# Patient Record
Sex: FEMALE | Race: WHITE | NOT HISPANIC OR LATINO | Employment: UNEMPLOYED | URBAN - METROPOLITAN AREA
[De-identification: names, ages, dates, MRNs, and addresses within clinical notes are randomized per-mention and may not be internally consistent; named-entity substitution may affect disease eponyms.]

---

## 2017-03-07 ENCOUNTER — HOSPITAL ENCOUNTER (EMERGENCY)
Facility: HOSPITAL | Age: 47
Discharge: HOME | End: 2017-03-07
Attending: EMERGENCY MEDICINE
Payer: MEDICAID

## 2017-03-07 VITALS
TEMPERATURE: 99 F | HEIGHT: 59 IN | SYSTOLIC BLOOD PRESSURE: 118 MMHG | OXYGEN SATURATION: 97 % | BODY MASS INDEX: 20.16 KG/M2 | DIASTOLIC BLOOD PRESSURE: 67 MMHG | WEIGHT: 100 LBS | RESPIRATION RATE: 16 BRPM | HEART RATE: 57 BPM

## 2017-03-07 DIAGNOSIS — R42 DIZZINESS: Primary | ICD-10-CM

## 2017-03-07 LAB
ANION GAP SERPL CALC-SCNC: 9 MMOL/L
BASOPHILS # BLD AUTO: 0.04 K/UL
BASOPHILS NFR BLD: 0.5 %
BUN SERPL-MCNC: 16 MG/DL
CALCIUM SERPL-MCNC: 9.9 MG/DL
CHLORIDE SERPL-SCNC: 105 MMOL/L
CO2 SERPL-SCNC: 26 MMOL/L
CREAT SERPL-MCNC: 0.7 MG/DL
DIFFERENTIAL METHOD: ABNORMAL
EOSINOPHIL # BLD AUTO: 0.1 K/UL
EOSINOPHIL NFR BLD: 1.3 %
ERYTHROCYTE [DISTWIDTH] IN BLOOD BY AUTOMATED COUNT: 13.1 %
EST. GFR  (AFRICAN AMERICAN): >60 ML/MIN/1.73 M^2
EST. GFR  (NON AFRICAN AMERICAN): >60 ML/MIN/1.73 M^2
GLUCOSE SERPL-MCNC: 98 MG/DL
HCT VFR BLD AUTO: 42.1 %
HGB BLD-MCNC: 14.6 G/DL
LYMPHOCYTES # BLD AUTO: 3.9 K/UL
LYMPHOCYTES NFR BLD: 44.9 %
MCH RBC QN AUTO: 30.5 PG
MCHC RBC AUTO-ENTMCNC: 34.7 %
MCV RBC AUTO: 88 FL
MONOCYTES # BLD AUTO: 0.7 K/UL
MONOCYTES NFR BLD: 8 %
NEUTROPHILS # BLD AUTO: 3.9 K/UL
NEUTROPHILS NFR BLD: 45.3 %
PLATELET # BLD AUTO: 262 K/UL
PMV BLD AUTO: 8.7 FL
POTASSIUM SERPL-SCNC: 4.2 MMOL/L
RBC # BLD AUTO: 4.79 M/UL
SODIUM SERPL-SCNC: 140 MMOL/L
WBC # BLD AUTO: 8.65 K/UL

## 2017-03-07 PROCEDURE — 63600175 PHARM REV CODE 636 W HCPCS: Performed by: EMERGENCY MEDICINE

## 2017-03-07 PROCEDURE — 85025 COMPLETE CBC W/AUTO DIFF WBC: CPT

## 2017-03-07 PROCEDURE — 25000003 PHARM REV CODE 250: Performed by: EMERGENCY MEDICINE

## 2017-03-07 PROCEDURE — 96374 THER/PROPH/DIAG INJ IV PUSH: CPT

## 2017-03-07 PROCEDURE — 99283 EMERGENCY DEPT VISIT LOW MDM: CPT | Mod: 25

## 2017-03-07 PROCEDURE — 96361 HYDRATE IV INFUSION ADD-ON: CPT

## 2017-03-07 PROCEDURE — 80048 BASIC METABOLIC PNL TOTAL CA: CPT

## 2017-03-07 RX ORDER — ONDANSETRON 2 MG/ML
4 INJECTION INTRAMUSCULAR; INTRAVENOUS
Status: COMPLETED | OUTPATIENT
Start: 2017-03-07 | End: 2017-03-07

## 2017-03-07 RX ORDER — MECLIZINE HCL 12.5 MG 12.5 MG/1
25 TABLET ORAL 3 TIMES DAILY PRN
Qty: 20 TABLET | Refills: 0 | Status: SHIPPED | OUTPATIENT
Start: 2017-03-07 | End: 2017-03-10

## 2017-03-07 RX ORDER — SODIUM CHLORIDE 9 MG/ML
1000 INJECTION, SOLUTION INTRAVENOUS ONCE
Status: COMPLETED | OUTPATIENT
Start: 2017-03-07 | End: 2017-03-07

## 2017-03-07 RX ORDER — MECLIZINE HYDROCHLORIDE 25 MG/1
50 TABLET ORAL
Status: COMPLETED | OUTPATIENT
Start: 2017-03-07 | End: 2017-03-07

## 2017-03-07 RX ADMIN — ONDANSETRON 4 MG: 2 INJECTION INTRAMUSCULAR; INTRAVENOUS at 02:03

## 2017-03-07 RX ADMIN — SODIUM CHLORIDE 1000 ML: 0.9 INJECTION, SOLUTION INTRAVENOUS at 02:03

## 2017-03-07 RX ADMIN — MECLIZINE 50 MG: 25 TABLET ORAL at 02:03

## 2017-03-07 NOTE — ED PROVIDER NOTES
"SCRIBE #1 NOTE: I, Apolinar Addison, am scribing for, and in the presence of, Lázaro López MD. I have scribed the entire note.      History      Chief Complaint   Patient presents with    Dizziness     Patient c/o dizziness, body aches, fatigue and SOB       Review of patient's allergies indicates:   Allergen Reactions    Asa [aspirin]     Keflex [cephalexin]      dizziness    Sulfa (sulfonamide antibiotics) Rash        HPI   HPI    3/7/2017, 1:46 PM   History obtained from the patient      History of Present Illness: Ami Massey is a 46 y.o. female patient who presents to the Emergency Department for dizziness which onset gradually today. Symptoms are constant and moderate in severity. Sx are exacerbated by nothing and relieved by nothing. Associated sxs include fatigue and generalized weakness. Pt states this dizziness is chronic due to the fluid in her ears as told via PCP and also states the dizziness she is experiencing today is no different than the dizziness she had in the past. Pt states she had a f/u appointment with her PCP to repeat blood work from one month ago but states "she was too busy to report to the ED." Pt is ambulatory into the ED without difficult. Patient denies any fever, N/V/D, chills, lightheadedness, HA, weakness/numbness, visual disturbance, gait problem and all other sxs at this time. No further complaints or concerns at this time.     Arrival mode: Personal vehicle    PCP: POLINA Dodson       Past Medical History:  Past Medical History:   Diagnosis Date    Anxiety     Hyperlipidemia        Past Surgical History:  Past Surgical History:   Procedure Laterality Date     SECTION      HYSTERECTOMY      has partial cervix and ovaries left         Family History:  history reviewed. Not pertinent.    Social History:  Social History     Social History Main Topics    Smoking status: Current Every Day Smoker     Packs/day: 0.50     Types: Cigarettes    Smokeless " tobacco: Unknown    Alcohol use No    Drug use: No    Sexual activity: Unknown       ROS   Review of Systems   Constitutional: Positive for fatigue. Negative for chills and fever.        (+)generalized weaknesss   HENT: Negative for congestion and sore throat.    Respiratory: Negative for chest tightness and shortness of breath.    Cardiovascular: Negative for chest pain.   Gastrointestinal: Negative for abdominal pain, nausea and vomiting.   Musculoskeletal: Negative for back pain and neck pain.   Skin: Negative for rash.   Neurological: Positive for dizziness. Negative for numbness and headaches.   Psychiatric/Behavioral: Negative for agitation and confusion.   All other systems reviewed and are negative.      Physical Exam    Initial Vitals   BP Pulse Resp Temp SpO2   03/07/17 1311 03/07/17 1311 03/07/17 1311 03/07/17 1311 03/07/17 1311   101/68 90 16 98.5 °F (36.9 °C) 97 %      Physical Exam  Nursing Notes and Vital Signs Reviewed.  Constitutional: Patient is in no apparent distress. Awake and alert. Well-developed and well-nourished.  Head: Atraumatic. Normocephalic.  Eyes: PERRL. EOM intact. Conjunctivae are not pale. No scleral icterus.  ENT: Mucous membranes are moist. Oropharynx is clear and symmetric.    Neck: Supple. Full ROM. No lymphadenopathy.  Cardiovascular: Regular rate. Regular rhythm. No murmurs, rubs, or gallops. Distal pulses are 2+ and symmetric.  Pulmonary/Chest: No respiratory distress. Clear to auscultation bilaterally. No wheezing, rales, or rhonchi.  Abdominal: Soft and non-distended.  There is no tenderness.  No rebound, guarding, or rigidity. Good bowel sounds.  Musculoskeletal: Moves all extremities. No obvious deformities. No edema. No calf tenderness.  Skin: Warm and dry.  Neurological: Patient is alert and oriented to person, place and time. Pupils ERRL and EOM normal. Cranial nerves II-XII are intact. Strength is full bilaterally; it is equal and 5/5 in bilateral upper and lower  "extremities. Light touch sense is intact. Speech is clear and normal. No dysmetria on finger to nose. Normal gait. No acute focal neurological deficits noted.  Psychiatric: Normal affect. Good eye contact. Appropriate in content.    ED Course    Procedures  ED Vital Signs:  Vitals:    03/07/17 1311 03/07/17 1640   BP: 101/68 118/67   Pulse: 90 (!) 57   Resp: 16 16   Temp: 98.5 °F (36.9 °C)    TempSrc: Oral    SpO2: 97% 97%   Weight: 45.4 kg (100 lb)    Height: 4' 11" (1.499 m)        Abnormal Lab Results:  Labs Reviewed   CBC W/ AUTO DIFFERENTIAL - Abnormal; Notable for the following:        Result Value    MPV 8.7 (*)     All other components within normal limits   BASIC METABOLIC PANEL        All Lab Results:  Results for orders placed or performed during the hospital encounter of 03/07/17   CBC auto differential   Result Value Ref Range    WBC 8.65 3.90 - 12.70 K/uL    RBC 4.79 4.00 - 5.40 M/uL    Hemoglobin 14.6 12.0 - 16.0 g/dL    Hematocrit 42.1 37.0 - 48.5 %    MCV 88 82 - 98 fL    MCH 30.5 27.0 - 31.0 pg    MCHC 34.7 32.0 - 36.0 %    RDW 13.1 11.5 - 14.5 %    Platelets 262 150 - 350 K/uL    MPV 8.7 (L) 9.2 - 12.9 fL    Gran # 3.9 1.8 - 7.7 K/uL    Lymph # 3.9 1.0 - 4.8 K/uL    Mono # 0.7 0.3 - 1.0 K/uL    Eos # 0.1 0.0 - 0.5 K/uL    Baso # 0.04 0.00 - 0.20 K/uL    Gran% 45.3 38.0 - 73.0 %    Lymph% 44.9 18.0 - 48.0 %    Mono% 8.0 4.0 - 15.0 %    Eosinophil% 1.3 0.0 - 8.0 %    Basophil% 0.5 0.0 - 1.9 %    Differential Method Automated    Basic metabolic panel   Result Value Ref Range    Sodium 140 136 - 145 mmol/L    Potassium 4.2 3.5 - 5.1 mmol/L    Chloride 105 95 - 110 mmol/L    CO2 26 23 - 29 mmol/L    Glucose 98 70 - 110 mg/dL    BUN, Bld 16 6 - 20 mg/dL    Creatinine 0.7 0.5 - 1.4 mg/dL    Calcium 9.9 8.7 - 10.5 mg/dL    Anion Gap 9 8 - 16 mmol/L    eGFR if African American >60 >60 mL/min/1.73 m^2    eGFR if non African American >60 >60 mL/min/1.73 m^2                The Emergency Provider reviewed the " vital signs and test results, which are outlined above.    ED Discussion     2:37 PM: Reassessed pt at this time.  Pt states her condition has improved at this time. Pt is in NAD. Pt is awake, alert, and oriented. Discussed with pt all pertinent ED information and results. Discussed pt dx and plan of tx. Gave pt all f/u and return to the ED instructions. All questions and concerns were addressed at this time. Pt expresses understanding of information and instructions, and is comfortable with plan to discharge. Pt is stable for discharge.    I discussed with patient and/or family/caretaker that evaluation in the ED does not suggest any emergent or life threatening medical conditions requiring immediate intervention beyond what was provided in the ED, and I believe patient is safe for discharge.  Regardless, an unremarkable evaluation in the ED does not preclude the development or presence of a serious of life threatening condition. As such, patient was instructed to return immediately for any worsening or change in current symptoms.      ED Medication(s):  Medications   0.9%  NaCl infusion (0 mLs Intravenous Stopped 3/7/17 6141)   ondansetron injection 4 mg (4 mg Intravenous Given 3/7/17 1416)   meclizine tablet 50 mg (50 mg Oral Given 3/7/17 1414)       Discharge Medication List as of 3/7/2017  2:47 PM      START taking these medications    Details   meclizine (ANTIVERT) 12.5 mg tablet Take 2 tablets (25 mg total) by mouth 3 (three) times daily as needed for Dizziness., Starting 3/7/2017, Until Discontinued, Print             Follow-up Information     Follow up with POLINA Dodson In 1 day.    Specialty:  Family Medicine    Why:  Patient should return to the ED for any concerns or worsening of condition.    Contact information:    [address] [facility]  [place] [address] [phone]          Follow up with Melchor Baltazar MD In 1 week.    Specialty:  Otolaryngology    Contact  information:    [address]  [place] [address]  [phone]              Medical Decision Making    Medical Decision Making:   Clinical Tests:   Lab Tests: Ordered and Reviewed           Scribe Attestation:   Scribe #1: I performed the above scribed service and the documentation accurately describes the services I performed. I attest to the accuracy of the note.    Attending:   Physician Attestation Statement for Scribe #1: I, Lázaro López MD, personally performed the services described in this documentation, as scribed by Apolinar Jaime, in my presence, and it is both accurate and complete.          Clinical Impression       ICD-10-CM ICD-9-CM   1. Dizziness R42 780.4       Disposition:   Disposition: Discharged  Condition: Stable         Lázaro López MD  03/08/17 9910

## 2017-03-07 NOTE — ED AVS SNAPSHOT
[facility]  [address]  [place] [address]               Ami Massey   3/7/2017  1:40 PM   ED    Description:  Female : 1970   Department:  [facility]           Your Care was Coordinated By:     Provider Role From To    Lázaro López MD Attending Provider 17 1340 --      Reason for Visit     Dizziness           Diagnoses this Visit        Comments    Dizziness    -  Primary       ED Disposition     None           To Do List           Follow-up Information     Follow up with POLINA Dodson In 1 day.    Specialty:  Family Medicine    Why:  Patient should return to the ED for any concerns or worsening of condition.    Contact information:    [address] [facility]  [place] [address] [phone]          Follow up with Melchor Baltazar MD In 1 week.    Specialty:  Otolaryngology    Contact information:    [address]  [place] [address]  [phone]         These Medications        Disp Refills Start End    meclizine (ANTIVERT) 12.5 mg tablet 20 tablet 0 3/7/2017     Take 2 tablets (25 mg total) by mouth 3 (three) times daily as needed for Dizziness. - Oral      [facility]     [facility] Care [facility] -  Assistance  Registered nurses in the [facility] provide clinical advisement, health education, appointment booking, and other advisory services.  Call for this free service at [phone].             Medications           Message regarding Medications     Verify the changes and/or additions to your medication regime listed below are the same as discussed with your clinician today.  If any of these changes or additions are incorrect, please notify your healthcare provider.        START taking these NEW medications        Refills    meclizine (ANTIVERT) 12.5 mg tablet 0    Sig: Take 2 tablets (25 mg total) by mouth 3 (three) times daily as needed for  "Dizziness.    Class: Print    Route: Oral      These medications were administered today        Dose Freq    0.9%  NaCl infusion 1,000 mL Once    Sig: Inject 1,000 mLs into the vein once.    Class: Normal    Route: Intravenous    ondansetron injection 4 mg 4 mg ED 1 Time    Sig: Inject 4 mg into the vein ED 1 Time.    Class: Normal    Route: Intravenous    meclizine tablet 50 mg 50 mg ED 1 Time    Sig: Take 2 tablets (50 mg total) by mouth ED 1 Time.    Class: Normal    Route: Oral           Verify that the below list of medications is an accurate representation of the medications you are currently taking.  If none reported, the list may be blank. If incorrect, please contact your healthcare provider. Carry this list with you in case of emergency.           Current Medications     ibuprofen (ADVIL,MOTRIN) 600 MG tablet Take 1 tablet (600 mg total) by mouth every 6 (six) hours as needed for Pain (take with food).    meclizine (ANTIVERT) 12.5 mg tablet Take 2 tablets (25 mg total) by mouth 3 (three) times daily as needed for Dizziness.    meclizine tablet 50 mg Take 2 tablets (50 mg total) by mouth ED 1 Time.    naproxen (NAPROSYN) 375 MG tablet Take 1 tablet (375 mg total) by mouth 2 (two) times daily with meals.           Clinical Reference Information           Your Vitals Were     BP Pulse Temp Resp Height Weight    101/68 (BP Location: Right arm, Patient Position: Sitting) 90 98.5 °F (36.9 °C) (Oral) 16 4' 11" (1.499 m) 45.4 kg (100 lb)    SpO2 BMI             97% 20.2 kg/m2         Allergies as of 3/7/2017        Reactions    Asa [Aspirin]     Keflex [Cephalexin]     dizziness    Sulfa (Sulfonamide Antibiotics) Rash      Immunizations Administered on Date of Encounter - 3/7/2017     None      ED Micro, Lab, POCT     Start Ordered       Status Ordering Provider    03/07/17 1348 03/07/17 1347  CBC auto differential  STAT      Final result     03/07/17 1348 03/07/17 1347  Basic metabolic panel  STAT      Final " result       ED Imaging Orders     None      [facility] Sign-Up     Activating your [facility] account is as easy as 1-2-3!     1) Visit [email or web address], select Sign Up Now, enter this activation code and your date of birth, then select Next.  [email or web address]  Expires: 4/21/2017  2:47 PM      2) Create a username and password to use when you visit [facility] in the future and select a security question in case you lose your password and select Next.    3) Enter your e-mail address and click Sign Up!    Additional Information  If you have questions, please e-mail [email or web address] or call [phone] to talk to our [facility] staff. Remember, [facility] is NOT to be used for urgent needs. For medical emergencies, dial 911.         Smoking Cessation     If you would like to quit smoking:   You may be eligible for free services if you are a [place] resident and started smoking cigarettes before September 1, 1988.  Call the Smoking Cessation Trust (SCT) toll free at (346) 175-0182 or (665) 688-4678.   Call 9-544-QUIT-NOW if you do not meet the above criteria.             [facility] complies with applicable Federal civil rights laws and does not discriminate on the basis of race, color, national origin, age, disability, or sex.        Language Assistance Services     ATTENTION: Language assistance services are available, free of charge. Please call [phone].      ATENCIÓN: Si habla español, tiene a ellison disposición servicios gratuitos de asistencia lingüística. Llame al [phone].     CHÚ Ý: N?u b?n nói Ti?ng Vi?t, có các d?ch v? h? tr? ngôn ng? mi?n phí dành cho b?n. G?i s? [phone].

## 2017-03-10 ENCOUNTER — CLINICAL SUPPORT (OUTPATIENT)
Dept: AUDIOLOGY | Facility: CLINIC | Age: 47
End: 2017-03-10
Payer: MEDICAID

## 2017-03-10 ENCOUNTER — TELEPHONE (OUTPATIENT)
Dept: OTOLARYNGOLOGY | Facility: CLINIC | Age: 47
End: 2017-03-10

## 2017-03-10 ENCOUNTER — OFFICE VISIT (OUTPATIENT)
Dept: OTOLARYNGOLOGY | Facility: CLINIC | Age: 47
End: 2017-03-10
Payer: MEDICAID

## 2017-03-10 VITALS
HEART RATE: 68 BPM | DIASTOLIC BLOOD PRESSURE: 78 MMHG | BODY MASS INDEX: 20.48 KG/M2 | SYSTOLIC BLOOD PRESSURE: 113 MMHG | WEIGHT: 101.44 LBS

## 2017-03-10 DIAGNOSIS — H93.13 TINNITUS, BILATERAL: ICD-10-CM

## 2017-03-10 DIAGNOSIS — H93.8X3 FULLNESS IN EAR, BILATERAL: ICD-10-CM

## 2017-03-10 DIAGNOSIS — R42 DIZZINESS: ICD-10-CM

## 2017-03-10 DIAGNOSIS — F17.200 SMOKER: ICD-10-CM

## 2017-03-10 DIAGNOSIS — J31.0 RHINITIS, UNSPECIFIED TYPE: Primary | ICD-10-CM

## 2017-03-10 DIAGNOSIS — H93.8X3 FULLNESS IN EAR, BILATERAL: Primary | ICD-10-CM

## 2017-03-10 PROCEDURE — 99204 OFFICE O/P NEW MOD 45 MIN: CPT | Mod: S$PBB,,, | Performed by: PHYSICIAN ASSISTANT

## 2017-03-10 PROCEDURE — 99999 PR PBB SHADOW E&M-EST. PATIENT-LVL III: CPT | Mod: PBBFAC,,, | Performed by: PHYSICIAN ASSISTANT

## 2017-03-10 PROCEDURE — 92567 TYMPANOMETRY: CPT | Mod: PBBFAC | Performed by: AUDIOLOGIST-HEARING AID FITTER

## 2017-03-10 RX ORDER — FLUTICASONE PROPIONATE 50 MCG
2 SPRAY, SUSPENSION (ML) NASAL DAILY
Qty: 1 BOTTLE | Refills: 12 | Status: SHIPPED | OUTPATIENT
Start: 2017-03-10 | End: 2018-03-30 | Stop reason: SDUPTHER

## 2017-03-10 NOTE — MR AVS SNAPSHOT
[facility]  [address] [facility] [address]  Phone: [phone]  Fax: [phone]                  Ami MUSA Callier   3/10/2017 1:40 PM   Office Visit    Description:  Female : 1970   Provider:  Magali Lazar PA-C   Department:  [facility]           Reason for Visit     Sinus Problem                To Do List           Future Appointments        Provider Department Dept Phone    3/10/2017 1:40 PM Magali Lazar PA-C [facility] [phone]    3/14/2017 4:00 PM [facility] Storm, [facility] [phone]      Goals (5 Years of Data)     None       These Medications        Disp Refills Start End    fluticasone (FLONASE) 50 mcg/actuation nasal spray 1 Bottle 12 3/10/2017     2 sprays by Each Nare route once daily. - Each Nare    Pharmacy: [facility] [address] [place] [facility] [place] [place] -  [facility] #: [phone]         [facility]     [facility] On Call Nurse Care Line -  Assistance  Registered nurses in the [facility] provide clinical advisement, health education, appointment booking, and other advisory services.  Call for this free service at [phone].             Medications           Message regarding Medications     Verify the changes and/or additions to your medication regime listed below are the same as discussed with your clinician today.  If any of these changes or additions are incorrect, please notify your healthcare provider.        START taking these NEW medications        Refills    fluticasone (FLONASE) 50 mcg/actuation nasal spray 12    Si sprays by Each Nare route once daily.    Class: Normal    Route: Each Nare      STOP taking these medications     ibuprofen (ADVIL,MOTRIN) 600 MG tablet Take 1 tablet (600 mg total) by mouth every 6 (six) hours as needed for Pain (take with food).    naproxen (NAPROSYN) 375 MG tablet Take 1 tablet (375 mg total) by  mouth 2 (two) times daily with meals.    meclizine (ANTIVERT) 12.5 mg tablet Take 2 tablets (25 mg total) by mouth 3 (three) times daily as needed for Dizziness.           Verify that the below list of medications is an accurate representation of the medications you are currently taking.  If none reported, the list may be blank. If incorrect, please contact your healthcare provider. Carry this list with you in case of emergency.           Current Medications     fluticasone (FLONASE) 50 mcg/actuation nasal spray 2 sprays by Each Nare route once daily.           Clinical Reference Information           Your Vitals Were     BP Pulse Weight BMI       113/78 68 46 kg (101 lb 6.6 oz) 20.48 kg/m2       Blood Pressure          Most Recent Value    BP  113/78      Allergies as of 3/10/2017     Asa [Aspirin]    Keflex [Cephalexin]    Sulfa (Sulfonamide Antibiotics)      Immunizations Administered on Date of Encounter - 3/10/2017     None      [facility] Sign-Up     Activating your [facility] account is as easy as 1-2-3!     1) Visit [email or web address], select Sign Up Now, enter this activation code and your date of birth, then select Next.  [email or web address]  Expires: 4/21/2017  2:47 PM      2) Create a username and password to use when you visit [facility] in the future and select a security question in case you lose your password and select Next.    3) Enter your e-mail address and click Sign Up!    Additional Information  If you have questions, please e-mail [email or web address] or call [phone] to talk to our [facility] staff. Remember, [facility] is NOT to be used for urgent needs. For medical emergencies, dial 911.         Smoking Cessation     If you would like to quit smoking:   You may be eligible for free services if you are a [place] resident and started smoking cigarettes before September 1, 1988.  Call the Smoking Cessation Trust (SCT) toll free at (441) 845-6370 or (856) 698-6432.   Call 4-606-QUIT-NOW if you  do not meet the above criteria.            Language Assistance Services     ATTENTION: Language assistance services are available, free of charge. Please call [phone].      ATENCIÓN: Si habla phil, tiene a ellison disposición servicios gratuitos de asistencia lingüística. Llame al [phone].     CHÚ Ý: N?u b?n nói Ti?ng Vi?t, có các d?ch v? h? tr? ngôn ng? mi?n phí dành cho b?n. G?i s? [phone].         [facility] complies with applicable Federal civil rights laws and does not discriminate on the basis of race, color, national origin, age, disability, or sex.

## 2017-03-10 NOTE — PROGRESS NOTES
"  Subjective:   Patient: Ami Massey [id number], :1970   Visit date:3/10/2017 11:48 AM    Chief Complaint:  Sinus Problem    HPI:  Ami is a 46 y.o. female who is here to see me today for sinus problems for few weeks.  She reports nasal congestion and clear runny nose for 2-3 weeks.  She has PCP a few times and was told she had fluid in her ears.  No antibiotics have been given.  She was started on Zyrtec but she stopped it because she noticed blood on tissues while blowing her nose.  She denies facial pain or pressure; denies thick nasal drainage or postnasal drainage.   Occasional sneezing; denies cough or sore throat; denies dysphagia. Denies fever.  She reports her ears feel full and her hearing seems diminished over past few weeks.  Does not notice one ear being the better hearing ear.  She also has tinnitus in both ears for few weeks.  Denies history of otologic surgery; denies history of loud noise exposure.  She tried Mucinex yesterday with no relief.  She's also having dizziness.  Describes it as "floating" all day, constant x 2 weeks; sometimes gets spinning sensation.  Denies aggravating or alleviating factors.  She is under a lot of stress right now with recent death of a friend.  Says when she cries, her nasal congestion and rhinorrhea are worse.  She does smoke.      Review of Systems:  Negative unless checked off.  Gen:  []fever   []fatigue  HENT:  []nosebleeds  []dental problem   Eyes:  []photophobia  []visual disturbance  Resp:  []chest tightness []wheezing  Card:  []chest pain  []leg swelling; Has Right bundle branch block  GI:  []abdominal pain []blood in stool  :  []dysuria  []hematuria  Musc:  []joint swelling  []gait problem  Skin:  []color change  []pallor  Neuro:  []seizures  []numbness  Hem:  []bruise/bleed easily  Psych:  []hallucinations  []behavioral problems  Allergy/Imm: is allergic to asa [aspirin]; keflex [cephalexin]; and sulfa (sulfonamide antibiotics).    Her meds, " allergies, medical, surgical, social & family histories were reviewed & updated:  -     She has a current medication list which includes the following prescription(s): fluticasone.  -     She  has a past medical history of Anxiety and Hyperlipidemia.   -     She  does not have a problem list on file.   -     She  has a past surgical history that includes Hysterectomy and  section.  -     She  reports that she has been smoking Cigarettes.  She has been smoking about 0.50 packs per day. She does not have any smokeless tobacco history on file. She reports that she does not drink alcohol or use illicit drugs.  -     Her family history is not on file.  -     She is allergic to asa [aspirin]; keflex [cephalexin]; and sulfa (sulfonamide antibiotics).    Objective:     Physical Exam:  Vitals:  /78  Pulse 68  Wt 46 kg (101 lb 6.6 oz)  BMI 20.48 kg/m2  General appearance:  Well developed, well nourished    Eyes:  Extraocular motions intact, PERRL    Communication:  no hoarseness, no dysphonia    Ears:  Otoscopy of external auditory canals and tympanic membranes was normal, clinical speech reception thresholds grossly intact, no mass/lesion of auricle.  No middle ear effusions.  Nose:  No masses/lesions of external nose, edematous nasal mucosa, septum, and turbinates were within normal limits.  Mouth:  Missing two right lower molars.  No mass/lesion of lips, gums, hard/soft palate, tongue, tonsils, or oropharynx.  Tender with palpation of right TMJ to angle of mandible.    Cardiovascular:  No pedal edema; Radial Pulses +2     Neck & Lymphatics:  No cervical lymphadenopathy, no neck mass/crepitus/ asymmetry, trachea is midline, no thyroid enlargement/tenderness/mass.    Psych: Oriented x3,  Alert with normal mood and affect.     Respiration/Chest:  Symmetric expansion during respiration, normal respiratory effort.    Skin:  Warm and intact. No ulcerations of face, scalp, neck.    Assessment & Plan:   Ami was  seen today for sinus problem.    Diagnoses and all orders for this visit:    Rhinitis, unspecified type    Dizziness    Tinnitus, bilateral    Fullness in ear, bilateral    Smoker    Other orders  -     fluticasone (FLONASE) 50 mcg/actuation nasal spray; 2 sprays by Each Nare route once daily.      1.  Rhinitis:  She does not meet criteria today for acute sinusitis.  No antibiotics necessary.  Would recommend Flonase daily.   The patient was given a prescription for a steroid nasal spray, and we discussed in detail the proper mechanism of use directing the spray away from the nasal septum.  In addition, we also discussed that it will take two to three weeks of daily use to achieve maximal effectiveness.  The patient will please call in 2-3 weeks with their progress.  Reassured her that increased nasal congestion and rhinorrhea while crying is normal.  2.  Dizziness:  I had a long discussion with the patient regarding their symptoms.  Dizziness, vertigo and disequilibrium are common symptoms reported by adults during visits to their doctors. They are all symptoms that can result from a peripheral vestibular disorder (a dysfunction of the balance organs of the inner ear) or central vestibular disorder (a dysfunction of one or more parts of the central nervous system that help process balance and spatial information).  There are also non-vestibular causes of dizziness, and dizziness can be linked to a wide array of problems such as blood-flow irregularities from cardiovascular problems and blood pressure fluctuations.  I would recommend a vestibular screen with audiogram for further diagnostic testing, and will contact the patient with further recommendations when that is complete. May need Ecochg.   3.  Tinnitus:  Recommend scheduling audiogram at her convenience.  We also discussed that tinnitus is most often caused by a hearing loss, and that as the hair cells are damaged, either genetic or as a result of loud noise  exposure, they then cause tinnitus.  Some patients find that restricting the salt or caffeine in their diet helps, and there is also an OTC supplement, lipflavinoids, that some people find to be effective though their benefit is not fully proven.  Tinnitus tends to be louder in times of stress and fatigue, and may decrease with time.  Sound machines may also be an effective masking technique if needed at night.  4.  Fullness in her ears:  Tympanograms today were both Type A, normal.  Discussed with patient that this confirms that she does not have fluid in her middle ear.  Discussed that Flonase may help with this as it treats ETD.  She also has tenderness over her right TMJ; she thinks her face is swollen on that side today but I don't appreciate any facial swelling or eryhtema or facial asymmetry or cervical or submandibular lymphadenopathy today.  She says she needs a bridge for her right lower teeth but hasn't gotten one yet.  We discussed TMJ arthralgia can cause ear pain.  It is important to avoid chewing gum or eating hard foods. Most cases of TMJ are temporary (usually <2 weeks); thus, treatment is usually conservative.    5.  Smoker:  Discussed with the patient the importance of stopping smoking, and that if they choose to continue to smoke it will make treating their nasal drainage more difficult.  Smoking irritates the lining of the nose, increasing nasal secretions and swelling. The nose becomes less able to cleanse itself and more susceptible to allergens. Smoking and secondhand smoke is associated with significant nasal and sinus disease and symptoms.  I would recommend irrigation with hypertonic saline solution and daily use of a nasal steroid spray.  Unfortunately, sometimes permanent damage has been done to the nasal mucosa (similar to lung damage) and nasal drainage may not improve even after stopping smoking.  I would not recommend pursuing any sort of allergy testing or treatment while the  patient is smoking, as it is unlikely to have significant benefit.        We discussed her medical conditions, treatments and plan.  Ami should return to clinic if any issues arise (symptoms worsen or persist), otherwise we will see her back in the clinic only as needed.    Thank you for allowing me to participate in the care of Ami.

## 2017-03-10 NOTE — TELEPHONE ENCOUNTER
----- Message from Kvng Lazar sent at 3/10/2017  8:23 AM CST -----  Contact: Pt   New medicaid pt requesting an hospital follow up for a sinus infection./ Pt is requesting to be seen today./ Pt can be reached at [phone] (nxqx)

## 2017-03-10 NOTE — PROGRESS NOTES
Tympanograms today (03/10/2017) per Magali Lazar PA-C.  Right ear: Type A.  .29 ml @ -16 daPa.  ECV: 1.1 ml  Left ear: Type A.  .57 ml @ -3 daPa.  ECV: 1.1 ml

## 2017-03-10 NOTE — TELEPHONE ENCOUNTER
Pt scheduled today to see Magali ORTA at 11:20am.  Pt verbalized understanding of date/time/location.

## 2017-03-14 ENCOUNTER — CLINICAL SUPPORT (OUTPATIENT)
Dept: AUDIOLOGY | Facility: CLINIC | Age: 47
End: 2017-03-14
Payer: MEDICAID

## 2017-03-14 DIAGNOSIS — R42 DIZZINESS AND GIDDINESS: Primary | ICD-10-CM

## 2017-03-14 PROCEDURE — 92567 TYMPANOMETRY: CPT | Mod: PBBFAC | Performed by: AUDIOLOGIST-HEARING AID FITTER

## 2017-03-14 PROCEDURE — 92540 BASIC VESTIBULAR EVALUATION: CPT | Mod: 26,S$PBB,, | Performed by: AUDIOLOGIST-HEARING AID FITTER

## 2017-03-14 PROCEDURE — 92557 COMPREHENSIVE HEARING TEST: CPT | Mod: PBBFAC | Performed by: AUDIOLOGIST-HEARING AID FITTER

## 2017-03-14 PROCEDURE — 92540 BASIC VESTIBULAR EVALUATION: CPT | Mod: PBBFAC | Performed by: AUDIOLOGIST-HEARING AID FITTER

## 2017-03-14 NOTE — PROGRESS NOTES
Referring provider: ROSALBA Dover Kayy Boogie was seen 03/14/2017 for an audiological evaluation and vestibular screen.  Patient complains of bilateral aural fullness.  She is using Flonase.  She denies hearing loss or tinnitus.  She is also having dizziness, described as floating all day.  This began about 2 weeks ago.  She sometimes gets spinning sensation. No aggravating or alleviating factors. She is under a lot of stress right now with recent death of a friend    Audiogram  Results reveal normal hearing 250-6000 Hz gently sloping to a mild indeterminate hearing loss at 8000 Hz for the right ear, and normal hearing 250-8000 Hz bilaterally.  Speech Reception Thresholds were  10 dBHL for the right ear and 15 dBHL for the left ear.   Word recognition scores were excellent for the right ear and excellent for the left ear.   Tympanograms were Type A for the right ear and Type A for the left ear.    Vestibular screen:  There was no gaze, spontaneous or positional nystagmus.  Head-shake test was normal - negative for nystagmus.  Kimberly-Hallpike was negative for BPPV to the right or to to the left.  Oculomotor function tests (sinusoidal tracking, saccade, OPK) were normal and symmetric.  Note: Patient did not like having having the video goggles with the lens closed, nervous in the dark and would remove goggles as soon as possible between subtests.     Summary: Normal VNG screen; no evidence of BPPV.    Rec:  ENT review  Continue with Flonase    Patient was counseled on the above findings.

## 2017-03-15 ENCOUNTER — TELEPHONE (OUTPATIENT)
Dept: OTOLARYNGOLOGY | Facility: CLINIC | Age: 47
End: 2017-03-15

## 2017-03-15 NOTE — TELEPHONE ENCOUNTER
----- Message from Magali Lazar PA-C sent at 3/15/2017  7:32 AM CDT -----  Please let Ms. Boogie know that I have reviewed her audiogram and inner ear testing and it shows that the balance function of the inner ear is normal.  No further dizziness workup from an ENT standpoint.  She should continue with Flonase everyday to help with ear fullness; no middle ear fluid on tympanograms.    ----- Message -----     From: John Paul Storm, CCC-A     Sent: 3/14/2017   4:46 PM       To: Magali Lazar PA-C    VNG review

## 2017-03-23 ENCOUNTER — HOSPITAL ENCOUNTER (EMERGENCY)
Facility: HOSPITAL | Age: 47
Discharge: HOME | End: 2017-03-23
Attending: EMERGENCY MEDICINE
Payer: MEDICAID

## 2017-03-23 ENCOUNTER — TELEPHONE (OUTPATIENT)
Dept: NEUROLOGY | Facility: CLINIC | Age: 47
End: 2017-03-23

## 2017-03-23 VITALS
SYSTOLIC BLOOD PRESSURE: 134 MMHG | OXYGEN SATURATION: 97 % | BODY MASS INDEX: 20.16 KG/M2 | TEMPERATURE: 98 F | RESPIRATION RATE: 18 BRPM | WEIGHT: 100 LBS | HEIGHT: 59 IN | HEART RATE: 72 BPM | DIASTOLIC BLOOD PRESSURE: 90 MMHG

## 2017-03-23 DIAGNOSIS — G47.9 DIFFICULTY SLEEPING: ICD-10-CM

## 2017-03-23 DIAGNOSIS — R42 DIZZINESS: Primary | ICD-10-CM

## 2017-03-23 LAB — POCT GLUCOSE: 80 MG/DL (ref 70–110)

## 2017-03-23 PROCEDURE — 25000003 PHARM REV CODE 250: Performed by: NURSE PRACTITIONER

## 2017-03-23 PROCEDURE — 99284 EMERGENCY DEPT VISIT MOD MDM: CPT

## 2017-03-23 PROCEDURE — 82962 GLUCOSE BLOOD TEST: CPT

## 2017-03-23 RX ORDER — CLONAZEPAM 1 MG/1
1 TABLET ORAL NIGHTLY
Qty: 15 TABLET | Refills: 0 | Status: SHIPPED | OUTPATIENT
Start: 2017-03-23 | End: 2018-07-29

## 2017-03-23 RX ORDER — MECLIZINE HYDROCHLORIDE 25 MG/1
25 TABLET ORAL
Status: COMPLETED | OUTPATIENT
Start: 2017-03-23 | End: 2017-03-23

## 2017-03-23 RX ORDER — LORAZEPAM 1 MG/1
1 TABLET ORAL
Status: COMPLETED | OUTPATIENT
Start: 2017-03-23 | End: 2017-03-23

## 2017-03-23 RX ADMIN — MECLIZINE 25 MG: 25 TABLET ORAL at 12:03

## 2017-03-23 RX ADMIN — LORAZEPAM 1 MG: 1 TABLET ORAL at 12:03

## 2017-03-23 NOTE — DISCHARGE INSTRUCTIONS

## 2017-03-23 NOTE — ED PROVIDER NOTES
"SCRIBE #1 NOTE: I, Cameron Demarco, am scribing for, and in the presence of, Sam Nieves NP. I have scribed the entire note.      History      Chief Complaint   Patient presents with    Dizziness     Patient c/o dizziness, and fatigue that started this morning        Review of patient's allergies indicates:   Allergen Reactions    Asa [aspirin]     Keflex [cephalexin]      dizziness    Sulfa (sulfonamide antibiotics) Rash        HPI   HPI    3/23/2017, 12:27 PM   History obtained from the patient      History of Present Illness: Ami Boogie is a 46 y.o. female patient who presents to the Emergency Department for dizziness which onset gradually 2-3 months ago. Sx are constant and moderate in severity. Pt states she has been seen multiple times with same complaint with no relief.  Pt reports difficulty sleeping with hot flashes since her hysterectomy 15. Pt states hot flashes occur during day but not as severe compared to night. She states she was given Effexor to try once which helped her sleep but pt did not like the tx as she "felt drugged" the next day. There are no mitigating or exacerbating factors noted. Pt denies any fever, chills, N/V/D, CP, SOB, focal weakness or numbness, HA, and all other sx at this time. Pt states she supposed to f/u with psychiatrist for her sleep problems. No further complaints or concerns at this time.        Arrival mode: Personal vehicle     PCP: POLINA Dodson       Past Medical History:  Past Medical History:   Diagnosis Date    Anxiety     Hyperlipidemia        Past Surgical History:  Past Surgical History:   Procedure Laterality Date     SECTION      HYSTERECTOMY      has partial cervix and ovaries left         Family History:  History reviewed. No pertinent family history.    Social History:  Social History     Social History Main Topics    Smoking status: Current Every Day Smoker     Packs/day: 0.50     Types: Cigarettes    Smokeless tobacco: " Not on file    Alcohol use No    Drug use: No    Sexual activity: Not on file       ROS   Review of Systems   Constitutional: Negative for chills and fever.        + hot flashes   HENT: Negative for sore throat and trouble swallowing.    Respiratory: Negative for cough and shortness of breath.    Cardiovascular: Negative for chest pain.   Gastrointestinal: Negative for abdominal pain, diarrhea, nausea and vomiting.   Genitourinary: Negative for dysuria.   Musculoskeletal: Negative for back pain.   Skin: Negative for rash and wound.   Neurological: Positive for dizziness. Negative for weakness, numbness and headaches.   Hematological: Does not bruise/bleed easily.   Psychiatric/Behavioral: Positive for sleep disturbance.   All other systems reviewed and are negative.      Physical Exam    Initial Vitals   BP Pulse Resp Temp SpO2   03/23/17 1130 03/23/17 1130 03/23/17 1130 03/23/17 1130 03/23/17 1130   134/90 72 18 98 °F (36.7 °C) 97 %      Physical Exam  Nursing Notes and Vital Signs Reviewed.  Constitutional: Patient is in no acute distress. Awake and alert. Well-developed and well-nourished.  Head: Atraumatic. Normocephalic.  Eyes: PERRL. EOM intact. Conjunctivae are not pale. No scleral icterus.  ENT: Mucous membranes are moist. Oropharynx is clear and symmetric.    Neck: Supple. Full ROM. No lymphadenopathy.  Cardiovascular: Regular rate. Regular rhythm. No murmurs, rubs, or gallops. Distal pulses are 2+ and symmetric.  Pulmonary/Chest: No respiratory distress. Clear to auscultation bilaterally. No wheezing, rales, or rhonchi.  Abdominal: Soft and non-distended.    Musculoskeletal: Moves all extremities. No obvious deformities. No edema.  Skin: Warm and dry.  Neurological: Patient is alert and oriented to person, place and time. Pupils ERRL and EOM normal. Cranial nerves II-XII are intact. Finger-to-nose is normal. Strength is full bilaterally; it is equal and 5/5 in bilateral upper and lower extremities.  "Light touch sense is intact. Speech is clear and normal. No acute focal neurological deficits noted.  Psychiatric: Normal affect. Good eye contact. Appropriate in content.    ED Course    Procedures  ED Vital Signs:  Vitals:    03/23/17 1130   BP: (!) 134/90   Pulse: 72   Resp: 18   Temp: 98 °F (36.7 °C)   TempSrc: Oral   SpO2: 97%   Weight: 45.4 kg (100 lb)   Height: 4' 11" (1.499 m)       Abnormal Lab Results:  Labs Reviewed   POCT GLUCOSE        All Lab Results:  Results for orders placed or performed during the hospital encounter of 03/23/17   POCT glucose   Result Value Ref Range    POCT Glucose 80 70 - 110 mg/dL         Imaging Results:  Imaging Results         CT Head Without Contrast (Final result) Result time:  03/23/17 13:18:28    Final result by JOSEFINA Mancia Sr., MD (03/23/17 13:18:28)    Impression:         Normal study.      All CT scans at this facility use dose modulation, iterative reconstruction, and/or weight base dosing when appropriate to reduce radiation dose when appropriate to reduce radiation dose to as low as reasonably achievable.      Electronically signed by: JOSEFINA MANCIA MD  Date:     03/23/17  Time:    13:18     Narrative:    CT of Head without IV contrast    History: Dizziness    Technique: Standard brain CT protocol without IV contrast was performed.     Finding: The ventricles have a normal size, position, and appearance. There is no abnormal intracranial mass or intracranial hemorrhage. There is no skull fracture. The paranasal sinuses are normal in appearance.                 The Emergency Provider reviewed the vital signs and test results, which are outlined above.    ED Discussion     1:24 PM: Reassessed pt at this time. Awake and alert. NAD. Discussed with pt all pertinent ED information and results. Discussed pt dx and plan of tx. Gave pt all f/u and return to the ED instructions. All questions and concerns were addressed at this time. Pt expresses understanding of " information and instructions, and is comfortable with plan to discharge. Pt is stable for discharge.    I discussed with patient and/or family/caretaker that evaluation in the ED does not suggest any emergent or life threatening medical conditions requiring immediate intervention beyond what was provided in the ED, and I believe patient is safe for discharge.  Regardless, an unremarkable evaluation in the ED does not preclude the development or presence of a serious of life threatening condition. As such, patient was instructed to return immediately for any worsening or change in current symptoms.      Pre-hypertension/Hypertension: The pt has been informed that they may have pre-hypertension or hypertension based on a blood pressure reading in the ED. I recommend that the pt call the PCP listed on their discharge instructions or a physician of their choice this week to arrange f/u for further evaluation of possible pre-hypertension or hypertension.     ED Medication(s):  Medications   lorazepam tablet 1 mg (1 mg Oral Given 3/23/17 1245)   meclizine tablet 25 mg (25 mg Oral Given 3/23/17 1245)       New Prescriptions    CLONAZEPAM (KLONOPIN) 1 MG TABLET    Take 1 tablet (1 mg total) by mouth every evening.       Follow-up Information     Follow up with POLINA Dodson. Schedule an appointment as soon as possible for a visit in 2 days.    Specialty:  Family Medicine    Contact information:    [address] [facility] [place] [address] [phone]               Medical Decision Making    Medical Decision Making:   History:   Old Medical Records: I decided to obtain old medical records.           Scribe Attestation:   Scribe #1: I performed the above scribed service and the documentation accurately describes the services I performed. I attest to the accuracy of the note.    APC:   APC Attestation Statement for Scribe #1: I, Sam Nieves NP, personally performed the services described in  this documentation, as scribed by Cameron Demarco in my presence, and it is both accurate and complete.          Clinical Impression       ICD-10-CM ICD-9-CM   1. Dizziness R42 780.4   2. Difficulty sleeping G47.9 780.50       Disposition:   Disposition: Discharged  Condition: Stable           Sam Nieves NP  03/23/17 [id number]

## 2017-03-23 NOTE — ED AVS SNAPSHOT
[facility]  [address]  [place] [address]               Ami Arnoldudreaux   3/23/2017 12:24 PM   ED    Description:  Female : 1970   Department:  [facility]           Your Care was Coordinated By:     Provider Role From To    Sam Nieves NP Nurse Practitioner 17 1224 --      Reason for Visit     Dizziness           Diagnoses this Visit        Comments    Dizziness    -  Primary     Difficulty sleeping           ED Disposition     ED Disposition Condition Comment    Discharge             To Do List           Follow-up Information     Follow up with POLINA Dodson. Schedule an appointment as soon as possible for a visit in 2 days.    Specialty:  Family Medicine    Contact information:    [address] [facility] [id number] [phone]          Follow up with [facility]. Schedule an appointment as soon as possible for a visit in 2 days.    Contact information:    [phone]       These Medications        Disp Refills Start End    clonazePAM (KLONOPIN) 1 MG tablet 15 tablet 0 3/23/2017 2017    Take 1 tablet (1 mg total) by mouth every evening. - Oral    Pharmacy: [facility] [id number] [facility] - [place], [place] -  [facility] #: [phone]         [facility]     [facility] Nurse Care [facility] -  Assistance  Registered nurses in the [facility] provide clinical advisement, health education, appointment booking, and other advisory services.  Call for this free service at [phone].             Medications           Message regarding Medications     Verify the changes and/or additions to your medication regime listed below are the same as discussed with your clinician today.  If any of these changes or additions are incorrect, please notify your healthcare provider.        START taking these NEW medications        Refills    clonazePAM (KLONOPIN)  "1 MG tablet 0    Sig: Take 1 tablet (1 mg total) by mouth every evening.    Class: Print    Route: Oral      These medications were administered today        Dose Freq    lorazepam tablet 1 mg 1 mg ED 1 Time    Sig: Take 1 tablet (1 mg total) by mouth ED 1 Time.    Class: Normal    Route: Oral    meclizine tablet 25 mg 25 mg ED 1 Time    Sig: Take 1 tablet (25 mg total) by mouth ED 1 Time.    Class: Normal    Route: Oral           Verify that the below list of medications is an accurate representation of the medications you are currently taking.  If none reported, the list may be blank. If incorrect, please contact your healthcare provider. Carry this list with you in case of emergency.           Current Medications     clonazePAM (KLONOPIN) 1 MG tablet Take 1 tablet (1 mg total) by mouth every evening.    fluticasone (FLONASE) 50 mcg/actuation nasal spray 2 sprays by Each Nare route once daily.           Clinical Reference Information           Your Vitals Were     BP Pulse Temp Resp Height Weight    134/90 (BP Location: Right arm, Patient Position: Sitting) 72 98 °F (36.7 °C) (Oral) 18 4' 11" (1.499 m) 45.4 kg (100 lb)    SpO2 BMI             97% 20.2 kg/m2         Allergies as of 3/23/2017        Reactions    Asa [Aspirin]     Keflex [Cephalexin]     dizziness    Sulfa (Sulfonamide Antibiotics) Rash      Immunizations Administered on Date of Encounter - 3/23/2017     None      ED Micro, Lab, POCT     Start Ordered       Status Ordering Provider    03/23/17 1127 03/23/17 1127  POCT glucose  Once      Final result       ED Imaging Orders     Start Ordered       Status Ordering Provider    03/23/17 1237 03/23/17 1237  CT Head Without Contrast  1 time imaging      Final result         Discharge Instructions         Dizziness (Uncertain Cause)  Dizziness is a common symptom. It may be described as lightheadedness, spinning, or feeling like you are going to faint. Dizziness can have many causes.  Be sure to tell the " healthcare provider about:  · All medicines you take, including prescription, over-the-counter, herbs, and supplements  · Any other symptoms you have  · Any health problems you are being treated for  · Anything that causes the dizziness to get worse or better  Today's exam did not show an exact cause for your dizziness. Other tests may be needed. Follow up with your healthcare provider.  Home care  · Dizziness that occurs with sudden standing may be a sign of mild dehydration. Drink extra fluids for the next few days.  · If you recently started a new medicine, stopped a medicine, or had the dose of a current medicine changed, talk with the prescribing healthcare provider. Your medicine plan may need adjustment.  · If dizziness lasts more than a few seconds, sit or lie down until it passes. This may help prevent injury in case you pass out.  · Do not drive or use power tools or dangerous equipment until you have had no dizziness for at least 48 hours.  Follow-up care  Follow up with your healthcare provider for further evaluation within the next 7 days or as advised.  When to seek medical advice  Call your healthcare provider for any of the following:  · Worsening of symptoms or new symptoms  · Passing out or seizure  · Repeated vomiting  · Headache  · Palpitations (the sense that your heart is fluttering or beating fast or hard)  · Shortness of breath  · Blood in vomit or stool (black or red color)  · Weakness of an arm or leg or one side of the face  · Vision or hearing changes  · Trouble walking or speaking  · Chest, arm, neck, back, or jaw pain  Date Last Reviewed: 8/23/2015  © 6990-8653 [employer]. [address] [place] [address], [place] [address]. All rights reserved. This information is not intended as a substitute for professional medical care. Always follow your healthcare professional's instructions.          [facility] Sign-Up     Activating your [facility] account is as easy as 1-2-3!     1) Visit  [email or web address], select Sign Up Now, enter this activation code and your date of birth, then select Next.  [email or web address]  Expires: 4/21/2017  3:47 PM      2) Create a username and password to use when you visit [facility] in the future and select a security question in case you lose your password and select Next.    3) Enter your e-mail address and click Sign Up!    Additional Information  If you have questions, please e-mail [email or web address] or call [phone] to talk to our [facility] staff. Remember, [facility] is NOT to be used for urgent needs. For medical emergencies, dial 911.          [facility] complies with applicable Federal civil rights laws and does not discriminate on the basis of race, color, national origin, age, disability, or sex.        Language Assistance Services     ATTENTION: Language assistance services are available, free of charge. Please call [phone].      ATENCIÓN: Si habla español, tiene a ellison disposición servicios gratuitos de asistencia lingüística. Llame al [phone].     CHÚ Ý: N?u b?n nói Ti?ng Vi?t, có các d?ch v? h? tr? ngôn ng? mi?n phí dành cho b?n. G?i s? [phone].

## 2017-03-23 NOTE — TELEPHONE ENCOUNTER
----- Message from Nelly Morin sent at 3/23/2017  1:57 PM CDT -----  Contact: Patient  Patient states the hospital told her to make an appointment with a neurologist in 3 days, patient is new and has medicaid. Please call her back at [phone] please a message with her son if she doesnt answer. Thank you

## 2017-10-23 ENCOUNTER — HOSPITAL ENCOUNTER (EMERGENCY)
Facility: HOSPITAL | Age: 47
Discharge: HOME | End: 2017-10-23
Payer: MEDICAID

## 2017-10-23 VITALS
RESPIRATION RATE: 20 BRPM | SYSTOLIC BLOOD PRESSURE: 135 MMHG | TEMPERATURE: 98 F | HEART RATE: 86 BPM | OXYGEN SATURATION: 100 % | DIASTOLIC BLOOD PRESSURE: 92 MMHG | WEIGHT: 96.44 LBS | BODY MASS INDEX: 19.44 KG/M2 | HEIGHT: 59 IN

## 2017-10-23 DIAGNOSIS — N30.90 CYSTITIS: Primary | ICD-10-CM

## 2017-10-23 DIAGNOSIS — L02.91 ABSCESS: ICD-10-CM

## 2017-10-23 LAB
BACTERIA #/AREA URNS HPF: ABNORMAL /HPF
BILIRUB UR QL STRIP: NEGATIVE
CLARITY UR: CLEAR
COLOR UR: YELLOW
GLUCOSE UR QL STRIP: NEGATIVE
HGB UR QL STRIP: NEGATIVE
KETONES UR QL STRIP: NEGATIVE
LEUKOCYTE ESTERASE UR QL STRIP: ABNORMAL
MICROSCOPIC COMMENT: ABNORMAL
NITRITE UR QL STRIP: NEGATIVE
PH UR STRIP: 6 [PH] (ref 5–8)
PROT UR QL STRIP: NEGATIVE
RBC #/AREA URNS HPF: 3 /HPF (ref 0–4)
SP GR UR STRIP: 1.01 (ref 1–1.03)
SQUAMOUS #/AREA URNS HPF: 10 /HPF
URN SPEC COLLECT METH UR: ABNORMAL
UROBILINOGEN UR STRIP-ACNC: NEGATIVE EU/DL
WBC #/AREA URNS HPF: 40 /HPF (ref 0–5)

## 2017-10-23 PROCEDURE — 81000 URINALYSIS NONAUTO W/SCOPE: CPT

## 2017-10-23 PROCEDURE — 87086 URINE CULTURE/COLONY COUNT: CPT

## 2017-10-23 PROCEDURE — 99283 EMERGENCY DEPT VISIT LOW MDM: CPT

## 2017-10-23 RX ORDER — AMOXICILLIN 500 MG/1
500 CAPSULE ORAL
COMMUNITY
End: 2017-10-25 | Stop reason: ALTCHOICE

## 2017-10-23 RX ORDER — MUPIROCIN 20 MG/G
OINTMENT TOPICAL 3 TIMES DAILY
Qty: 30 G | Refills: 0 | Status: SHIPPED | OUTPATIENT
Start: 2017-10-23 | End: 2017-11-02

## 2017-10-23 RX ORDER — ATORVASTATIN CALCIUM 20 MG/1
20 TABLET, FILM COATED ORAL DAILY
COMMUNITY

## 2017-10-23 RX ORDER — PHENAZOPYRIDINE HYDROCHLORIDE 200 MG/1
200 TABLET, FILM COATED ORAL 3 TIMES DAILY PRN
COMMUNITY
End: 2018-07-16

## 2017-10-23 RX ORDER — NITROFURANTOIN 25; 75 MG/1; MG/1
100 CAPSULE ORAL
COMMUNITY
End: 2017-10-25 | Stop reason: SINTOL

## 2017-10-24 NOTE — ED PROVIDER NOTES
SCRIBE #1 NOTE: I, Femi Luque, am scribing for, and in the presence of, YOU Oakley. I have scribed the entire note.      History      Chief Complaint   Patient presents with    Urinary Tract Infection     States she is being treated, but thinks she needs to be on different antibiotics.    Abscess     to right ankle; on abx.  See prior note.       Review of patient's allergies indicates:   Allergen Reactions    Asa [aspirin]     Keflex [cephalexin]      dizziness    Sulfa (sulfonamide antibiotics) Rash        HPI   HPI    10/23/2017, 9:21 PM   History obtained from the patient      History of Present Illness: Ami Boogie is a 47 y.o. female patient who presents to the Emergency Department for further evaluation of a possible UTI which onset gradually 1 week ago. Pt complains of increased urinary frequency. Symptoms are constant and moderate in severity. Pt is currently on Macrobid and Amoxicillin for tx of a UTI and abscess, prescribed by PCP. Pt denies having blood work/ UA done. No mitigating or exacerbating factors reported.  Patient denies any fever, chills, diaphoresis, dysuria, hematuria, abd pain, pelvic pain, and all other sxs at this time. No further complaints or concerns at this time.  Patient states that she is concerned she is not on correct antibiotic for UTI; she states that PCP did not obtain urine for a urinalysis or urine culture.  Patient is requesting urinalysis and urine culture.         Arrival mode: Personal vehicle    PCP: POLINA Dodson       Past Medical History:  Past Medical History:   Diagnosis Date    Anxiety     Hyperlipidemia        Past Surgical History:  Past Surgical History:   Procedure Laterality Date     SECTION      HYSTERECTOMY      has partial cervix and ovaries left         Family History:  History reviewed. No pertinent family history.    Social History:  Social History     Social History Main Topics    Smoking status: Current  Every Day Smoker     Packs/day: 0.50     Types: Cigarettes    Smokeless tobacco: Unknown    Alcohol use No    Drug use: No    Sexual activity: Unknown       ROS   Review of Systems   Constitutional: Negative for chills, diaphoresis and fever.   Respiratory: Negative for shortness of breath.    Cardiovascular: Negative for chest pain.   Gastrointestinal: Negative for abdominal pain, diarrhea, nausea and vomiting.   Genitourinary: Positive for frequency (increased). Negative for difficulty urinating, dysuria, hematuria, menstrual problem, pelvic pain and urgency.   Musculoskeletal: Negative for back pain.        + abscess to the right ankle   Skin: Negative for rash.   Neurological: Negative for dizziness, syncope, weakness, light-headedness, numbness and headaches.   All other systems reviewed and are negative.      Physical Exam      Initial Vitals [10/23/17 2117]   BP Pulse Resp Temp SpO2   (!) 135/92 86 20 98.3 °F (36.8 °C) 100 %      MAP       106.33          Physical Exam  Nursing Notes and Vital Signs Reviewed.  Constitutional: Patient is in no apparent distress. Well-developed and well-nourished.  Head: Atraumatic. Normocephalic.  Eyes: PERRL. EOM intact. Conjunctivae are not pale. No scleral icterus.  ENT: Mucous membranes are moist.   Neck: Supple. Full ROM. No lymphadenopathy.  Cardiovascular: Regular rate. Regular rhythm. No murmurs, rubs, or gallops. Distal pulses are 2+ and symmetric.  Pulmonary/Chest: No respiratory distress. Clear to auscultation bilaterally. No wheezing, rales, or rhonchi.  Abdominal: Soft and non-distended.  There is no tenderness.  No rebound, guarding, or rigidity. Good bowel sounds.  Musculoskeletal: Moves all extremities. No obvious deformities. No edema. No calf tenderness.  Skin: Warm and dry. 1.5 cm abscess to the right lower leg appreciated.   Neurological:  Alert, awake, and appropriate.  Normal speech.  No acute focal neurological deficits are appreciated.  Psychiatric:  "Normal affect. Good eye contact. Appropriate in content.    ED Course    Procedures  ED Vital Signs:  Vitals:    10/23/17 2117   BP: (!) 135/92   Pulse: 86   Resp: 20   Temp: 98.3 °F (36.8 °C)   TempSrc: Oral   SpO2: 100%   Weight: 43.8 kg (96 lb 7.2 oz)   Height: 4' 11" (1.499 m)          Results for orders placed or performed during the hospital encounter of 10/23/17   Urinalysis Clean Catch   Result Value Ref Range    Specimen UA Urine, Clean Catch     Color, UA Yellow Yellow, Straw, Ivy    Appearance, UA Clear Clear    pH, UA 6.0 5.0 - 8.0    Specific Gravity, UA 1.015 1.005 - 1.030    Protein, UA Negative Negative    Glucose, UA Negative Negative    Ketones, UA Negative Negative    Bilirubin (UA) Negative Negative    Occult Blood UA Negative Negative    Nitrite, UA Negative Negative    Urobilinogen, UA Negative <2.0 EU/dL    Leukocytes, UA 1+ (A) Negative   Urinalysis Microscopic   Result Value Ref Range    RBC, UA 3 0 - 4 /hpf    WBC, UA 40 (H) 0 - 5 /hpf    Bacteria, UA Occasional None-Occ /hpf    Squam Epithel, UA 10 /hpf    Microscopic Comment SEE COMMENT          The Emergency Provider reviewed the vital signs and test results, which are outlined above.    ED Discussion     9:46 PM:  Discussed with pt all pertinent ED information and results. Discussed pt dx and plan of tx. Recommend patient continue current medication regimen.  Depending on urine culture; patient may need different antibiotic.  Gave pt all f/u and return to the ED instructions. All questions and concerns were addressed at this time. Pt expresses understanding of information and instructions, and is comfortable with plan to discharge. Pt is stable for discharge.    I discussed with patient and/or family/caretaker that evaluation in the ED does not suggest any emergent or life threatening medical conditions requiring immediate intervention beyond what was provided in the ED, and I believe patient is safe for discharge.  Regardless, an " unremarkable evaluation in the ED does not preclude the development or presence of a serious of life threatening condition. As such, patient was instructed to return immediately for any worsening or change in current symptoms.      ED Medication(s):  Medications - No data to display    Discharge Medication List as of 10/23/2017  9:48 PM      START taking these medications    Details   mupirocin (BACTROBAN) 2 % ointment Apply topically 3 (three) times daily., Starting Mon 10/23/2017, Until Thu 11/2/2017, Print             Follow-up Information     POLINA Dodson In 3 days.    Specialty:  Family Medicine  Contact information:  [address] [facility] [address]  [phone]                     Medical Decision Making              Scribe Attestation:   Scribe #1: I performed the above scribed service and the documentation accurately describes the services I performed. I attest to the accuracy of the note.    Attending:   Physician Attestation Statement for Scribe #1: I, YOU Oakley, personally performed the services described in this documentation, as scribed by Femi Luque, in my presence, and it is both accurate and complete.         Attending Attestation:     Physician Attestation Statement for NP/PA:   I discussed this assessment and plan of this patient with the NP/PA, but I did not personally examine the patient. The face to face encounter was performed by the NP/PA.              Clinical Impression       ICD-10-CM ICD-9-CM   1. Cystitis N30.90 595.9   2. Abscess L02.91 682.9       Disposition:   Disposition: Discharged  Condition: Stable         Jessie Houston PA-C  10/24/17 0228       Jessie Houston PA-C  10/24/17 0233       Abram Sands MD  10/24/17 5750

## 2017-10-25 ENCOUNTER — HOSPITAL ENCOUNTER (EMERGENCY)
Facility: HOSPITAL | Age: 47
Discharge: HOME | End: 2017-10-25
Attending: EMERGENCY MEDICINE
Payer: MEDICAID

## 2017-10-25 VITALS
SYSTOLIC BLOOD PRESSURE: 116 MMHG | BODY MASS INDEX: 19.56 KG/M2 | RESPIRATION RATE: 17 BRPM | OXYGEN SATURATION: 98 % | HEIGHT: 59 IN | WEIGHT: 97 LBS | DIASTOLIC BLOOD PRESSURE: 79 MMHG | TEMPERATURE: 99 F | HEART RATE: 98 BPM

## 2017-10-25 DIAGNOSIS — L03.90 CELLULITIS: ICD-10-CM

## 2017-10-25 DIAGNOSIS — L03.115 CELLULITIS OF RIGHT ANKLE: Primary | ICD-10-CM

## 2017-10-25 DIAGNOSIS — F41.9 ANXIETY: ICD-10-CM

## 2017-10-25 DIAGNOSIS — T50.905A MEDICATION SIDE EFFECT, INITIAL ENCOUNTER: ICD-10-CM

## 2017-10-25 LAB
BACTERIA UR CULT: NO GROWTH
BILIRUB UR QL STRIP: NEGATIVE
CLARITY UR: CLEAR
COLOR UR: YELLOW
GLUCOSE UR QL STRIP: NEGATIVE
HGB UR QL STRIP: ABNORMAL
KETONES UR QL STRIP: NEGATIVE
LEUKOCYTE ESTERASE UR QL STRIP: NEGATIVE
NITRITE UR QL STRIP: NEGATIVE
PH UR STRIP: 6 [PH] (ref 5–8)
PROT UR QL STRIP: NEGATIVE
SP GR UR STRIP: 1.02 (ref 1–1.03)
URN SPEC COLLECT METH UR: ABNORMAL
UROBILINOGEN UR STRIP-ACNC: NEGATIVE EU/DL

## 2017-10-25 PROCEDURE — 81003 URINALYSIS AUTO W/O SCOPE: CPT

## 2017-10-25 PROCEDURE — 99284 EMERGENCY DEPT VISIT MOD MDM: CPT

## 2017-10-25 RX ORDER — CLINDAMYCIN HYDROCHLORIDE 150 MG/1
300 CAPSULE ORAL 4 TIMES DAILY
Qty: 56 CAPSULE | Refills: 0 | Status: SHIPPED | OUTPATIENT
Start: 2017-10-25 | End: 2017-10-26 | Stop reason: CLARIF

## 2017-10-25 NOTE — ED PROVIDER NOTES
SCRIBE #1 NOTE: I, Nathan Sheppard, am scribing for, and in the presence of, Molly Bennett MD. I have scribed the entire note.      History      Chief Complaint   Patient presents with    Medication Reaction     pt states she had a blister that got clawed by her dog, states medication has caused her to be druged to the core, glazed eyes. taking antibiotics.        Review of patient's allergies indicates:   Allergen Reactions    Asa [aspirin]     Ciprofloxacin     Keflex [cephalexin]      dizziness    Sulfa (sulfonamide antibiotics) Rash        HPI   HPI    10/25/2017, 1:45 PM   History obtained from the patient and son      History of Present Illness: Ami Boogie is a 47 y.o. female patient who presents to the Emergency Department for rash which onset gradually 2 days ago after being started on Amoxicillin. Sxs are constant and moderate in severity. Rash described as bumps and located to chest, trunk, and back. There are no mitigating or exacerbating factors noted. Pt has wound to right ankle from her dog scratching her with it's claws 2 days ago, currently on amoxicillin. Associated sxs include fatigue which pt believes is from her ABX.  Pt denies any fever, N/V/D, chills, calf pain, leg pain or swelling, numbness, LOC, ABD pain, trouble swallowing, and all other sxs at this time. Prior tx includes Amoxicillin 500 mg BID. Pt is also taking Macrobid for UTI. Pt was seen in ED previously for wound and started using Bactroban Rx today. No further complaints or concerns at this time.  Immunizations up to date.       Arrival mode: Personal vehicle        PCP: POLINA Dodson       Past Medical History:  Past Medical History:   Diagnosis Date    Anxiety     Hyperlipidemia        Past Surgical History:  Past Surgical History:   Procedure Laterality Date     SECTION      HYSTERECTOMY      has partial cervix and ovaries left         Family History:  unknown    Social  History:  Social History     Social History Main Topics    Smoking status: Current Every Day Smoker     Packs/day: 0.50     Types: Cigarettes    Smokeless tobacco: Never Used    Alcohol use No    Drug use: No    Sexual activity: unknown       ROS   Review of Systems   Constitutional: Positive for fatigue. Negative for fever.   HENT: Negative for sore throat.    Respiratory: Negative for shortness of breath.    Cardiovascular: Negative for chest pain.   Gastrointestinal: Negative for abdominal pain, constipation, nausea and vomiting.   Genitourinary: Negative for dysuria.   Musculoskeletal: Negative for back pain.   Skin: Positive for rash and wound (right ankle).   Neurological: Negative for dizziness, syncope, weakness, numbness and headaches.   Hematological: Does not bruise/bleed easily.     Physical Exam      Initial Vitals [10/25/17 1258]   BP Pulse Resp Temp SpO2   115/84 (!) 116 16 98.8 °F (37.1 °C) 98 %      MAP       94.33          Physical Exam  Nursing Notes and Vital Signs Reviewed.  Constitutional: Patient is in no acute distress. Well-developed and well-nourished.  Head: Atraumatic. Normocephalic.  Eyes: PERRL. EOM intact. Conjunctivae are not pale. No scleral icterus.  ENT: Mucous membranes are moist. Oropharynx is clear and symmetric.    Neck: Supple. Full ROM. No lymphadenopathy.  Cardiovascular: Regular rate. Regular rhythm. No murmurs, rubs, or gallops. Distal pulses are 2+ and symmetric.  Pulmonary/Chest: No respiratory distress. Clear to auscultation bilaterally. No wheezing, rales, or rhonchi.  Abdominal: Soft and non-distended.  There is no tenderness.  No rebound, guarding, or rigidity. Good bowel sounds.  Genitourinary: No CVA tenderness  Musculoskeletal: Moves all extremities. No obvious deformities. No edema. No calf tenderness.  Skin: Warm and dry. Small macules to trunk and back, no evidence of secondary infection. Small area of blanching erythema and mild warmth to right outer  "ankle consistent with soft tissue cellulitis, no abscess, no induration, no fluctuance. No calf pain or calf swelling, or leg swelling.   Neurological:  Alert, awake, and appropriate.  Normal speech.  No acute focal neurological deficits are appreciated.  Psychiatric: Normal affect. Good eye contact. Appropriate in content. Anxious.    ED Course    Procedures  ED Vital Signs:  Vitals:    10/25/17 1258 10/25/17 1456   BP: 115/84 116/79   Pulse: (!) 116 98   Resp: 16 17   Temp: 98.8 °F (37.1 °C)    TempSrc: Oral    SpO2: 98%    Weight: 44 kg (97 lb)    Height: 4' 11" (1.499 m)        Abnormal Lab Results:  Labs Reviewed   URINALYSIS - Abnormal; Notable for the following:        Result Value    Occult Blood UA Trace (*)     All other components within normal limits        All Lab Results:  Results for orders placed or performed during the hospital encounter of 10/25/17   Urinalysis Clean Catch   Result Value Ref Range    Specimen UA Urine, Clean Catch     Color, UA Yellow Yellow, Straw, Ivy    Appearance, UA Clear Clear    pH, UA 6.0 5.0 - 8.0    Specific Gravity, UA 1.020 1.005 - 1.030    Protein, UA Negative Negative    Glucose, UA Negative Negative    Ketones, UA Negative Negative    Bilirubin (UA) Negative Negative    Occult Blood UA Trace (A) Negative    Nitrite, UA Negative Negative    Urobilinogen, UA Negative <2.0 EU/dL    Leukocytes, UA Negative Negative         Imaging Results:  Imaging Results          X-Ray Ankle Complete Right (Final result)  Result time 10/25/17 14:13:51    Final result by Jermaine Rojas MD (10/25/17 14:13:51)                 Impression:      No acute fracture or dislocation.        Electronically signed by: JERMAINE ROJAS MD  Date:     10/25/17  Time:    14:13              Narrative:    History:  Saline this    Comparison:  None    Results:  Three views of the right foot were obtained.    No evidence of acute fracture or dislocation.  Bony mineralization is normal.  Mild soft tissue " swelling. No subcutaneous gas.                                      The Emergency Provider reviewed the vital signs and test results, which are outlined above.    ED Discussion     2:49 PM: Reassessed pt. Discussed with pt all pertinent ED information and results. Discussed plan of treatment with pt. Pt instructed to discontinue macrobid (likely cause of her drowsiness and rash)and amoxicillin. Will start on Clindamycin for cellulitis. Pt will be started on clindamycin and is instructed to continue using bactroban as Rx. Gave pt all f/u and return to the ED instructions. All questions and concerns were addressed at this time. Pt understands and agrees to plan as discussed. Pt is stable for discharge.       ED Medication(s):  Medications - No data to display    Discharge Medication List as of 10/25/2017  2:49 PM      START taking these medications    Details   clindamycin (CLEOCIN) 150 MG capsule Take 2 capsules (300 mg total) by mouth 4 (four) times daily., Starting Wed 10/25/2017, Until Wed 11/1/2017, Print             Follow-up Information     POLINA Dodson. Schedule an appointment as soon as possible for a visit in 2 days.    Specialty:  Family Medicine  Why:  Return to the Emergency room, If symptoms worsen  Contact information:  [address] [facility] [address]  [phone]                     Medical Decision Making    Medical Decision Making:   Clinical Tests:   Lab Tests: Reviewed and Ordered  Radiological Study: Reviewed and Ordered           Scribe Attestation:   Scribe #1: I performed the above scribed service and the documentation accurately describes the services I performed. I attest to the accuracy of the note.    Attending:   Physician Attestation Statement for Scribe #1: I, Molly Bennett MD, personally performed the services described in this documentation, as scribed by Nathan Sheppard, in my presence, and it is both accurate and complete.           Clinical Impression       ICD-10-CM ICD-9-CM   1. Cellulitis of right ankle L03.115 682.6   2. Cellulitis L03.90 682.9   3. Anxiety F41.9 300.00   4. Medication side effect, initial encounter T88.7XXA E947.9       Disposition:   Disposition: Discharged  Condition: Stable         Molly Bennett MD  10/25/17 7874

## 2017-10-26 ENCOUNTER — HOSPITAL ENCOUNTER (EMERGENCY)
Facility: HOSPITAL | Age: 47
Discharge: HOME | End: 2017-10-26
Attending: EMERGENCY MEDICINE
Payer: MEDICAID

## 2017-10-26 VITALS
HEIGHT: 59 IN | WEIGHT: 99 LBS | TEMPERATURE: 100 F | BODY MASS INDEX: 19.96 KG/M2 | OXYGEN SATURATION: 99 % | RESPIRATION RATE: 18 BRPM | DIASTOLIC BLOOD PRESSURE: 63 MMHG | SYSTOLIC BLOOD PRESSURE: 104 MMHG | HEART RATE: 80 BPM

## 2017-10-26 DIAGNOSIS — L03.115 CELLULITIS OF RIGHT LOWER EXTREMITY: Primary | ICD-10-CM

## 2017-10-26 LAB
ALBUMIN SERPL BCP-MCNC: 3.7 G/DL
ALP SERPL-CCNC: 188 U/L
ALT SERPL W/O P-5'-P-CCNC: 155 U/L
ANION GAP SERPL CALC-SCNC: 11 MMOL/L
AST SERPL-CCNC: 116 U/L
BASOPHILS # BLD AUTO: 0.03 K/UL
BASOPHILS NFR BLD: 0.5 %
BILIRUB SERPL-MCNC: 0.5 MG/DL
BILIRUB UR QL STRIP: NEGATIVE
BUN SERPL-MCNC: 13 MG/DL
CALCIUM SERPL-MCNC: 9.8 MG/DL
CHLORIDE SERPL-SCNC: 101 MMOL/L
CLARITY UR: CLEAR
CO2 SERPL-SCNC: 27 MMOL/L
COLOR UR: YELLOW
CREAT SERPL-MCNC: 0.8 MG/DL
DIFFERENTIAL METHOD: ABNORMAL
EOSINOPHIL # BLD AUTO: 0 K/UL
EOSINOPHIL NFR BLD: 0 %
ERYTHROCYTE [DISTWIDTH] IN BLOOD BY AUTOMATED COUNT: 13.3 %
EST. GFR  (AFRICAN AMERICAN): >60 ML/MIN/1.73 M^2
EST. GFR  (NON AFRICAN AMERICAN): >60 ML/MIN/1.73 M^2
GLUCOSE SERPL-MCNC: 98 MG/DL
GLUCOSE UR QL STRIP: NEGATIVE
HCT VFR BLD AUTO: 40.6 %
HGB BLD-MCNC: 13.8 G/DL
HGB UR QL STRIP: ABNORMAL
KETONES UR QL STRIP: NEGATIVE
LACTATE SERPL-SCNC: 1.7 MMOL/L
LEUKOCYTE ESTERASE UR QL STRIP: NEGATIVE
LYMPHOCYTES # BLD AUTO: 1.9 K/UL
LYMPHOCYTES NFR BLD: 30.5 %
MCH RBC QN AUTO: 30.1 PG
MCHC RBC AUTO-ENTMCNC: 34 G/DL
MCV RBC AUTO: 89 FL
MONOCYTES # BLD AUTO: 1 K/UL
MONOCYTES NFR BLD: 15.9 %
NEUTROPHILS # BLD AUTO: 3.2 K/UL
NEUTROPHILS NFR BLD: 53.1 %
NITRITE UR QL STRIP: NEGATIVE
PH UR STRIP: 6 [PH] (ref 5–8)
PLATELET # BLD AUTO: 190 K/UL
PMV BLD AUTO: 9.3 FL
POTASSIUM SERPL-SCNC: 3.9 MMOL/L
PROT SERPL-MCNC: 7.7 G/DL
PROT UR QL STRIP: NEGATIVE
RBC # BLD AUTO: 4.58 M/UL
SODIUM SERPL-SCNC: 139 MMOL/L
SP GR UR STRIP: <=1.005 (ref 1–1.03)
URN SPEC COLLECT METH UR: ABNORMAL
UROBILINOGEN UR STRIP-ACNC: NEGATIVE EU/DL
WBC # BLD AUTO: 6.1 K/UL

## 2017-10-26 PROCEDURE — 80053 COMPREHEN METABOLIC PANEL: CPT

## 2017-10-26 PROCEDURE — 83605 ASSAY OF LACTIC ACID: CPT

## 2017-10-26 PROCEDURE — 85025 COMPLETE CBC W/AUTO DIFF WBC: CPT

## 2017-10-26 PROCEDURE — 99283 EMERGENCY DEPT VISIT LOW MDM: CPT

## 2017-10-26 PROCEDURE — 81003 URINALYSIS AUTO W/O SCOPE: CPT

## 2017-10-26 PROCEDURE — 87040 BLOOD CULTURE FOR BACTERIA: CPT | Mod: 59

## 2017-10-26 RX ORDER — SULFAMETHOXAZOLE AND TRIMETHOPRIM 800; 160 MG/1; MG/1
2 TABLET ORAL 2 TIMES DAILY
Qty: 28 TABLET | Refills: 0 | Status: SHIPPED | OUTPATIENT
Start: 2017-10-26 | End: 2017-11-02

## 2017-10-26 RX ORDER — FLUCONAZOLE 150 MG/1
150 TABLET ORAL ONCE
Qty: 1 TABLET | Refills: 0 | Status: SHIPPED | OUTPATIENT
Start: 2017-10-26 | End: 2017-10-26

## 2017-10-26 NOTE — ED PROVIDER NOTES
"SCRIBE #1 NOTE: I, Lorenzo Sawant, am scribing for, and in the presence of, Jose Cason MD. I have scribed the entire note.      History      Chief Complaint   Patient presents with    Rash     pt c/o generalized rash, pt states she is having an "allergic reaction," pt states "they need to admit me, I need cultures to make sure this is not in my blood."       Review of patient's allergies indicates:   Allergen Reactions    Asa [aspirin]     Augmentin [amoxicillin-pot clavulanate] Other (See Comments)     Pt states she can take amoxicillin alone, but cannot tolerate Augmentin, it eroded her esophagus.    Ciprofloxacin     Keflex [cephalexin]      dizziness    Sulfa (sulfonamide antibiotics) Rash        HPI   HPI    10/26/2017, 3:18 PM   History obtained from the patient      History of Present Illness: Ami Boogie is a 47 y.o. female patient who presents to the Emergency Department for an evaluation of a rash which onset gradually x4 days ago. Pt was seen in this ED x2 since onset for an evaluation and has returned today for admission to have her blood cultured to rule out a blood infection. Pt is currently on Amoxicillin 500 mg BID, Macrobid for a UTI, and Bactroban for a wound she was evaluated for during her first visit to this ED. Pt describes her rash as bumps across her chest, trunk, and back. Symptoms are constant and moderate in severity. Exacerbated by nothing and relieved by nothing. Patient denies any fever, chills, difficulty swallowing, tongue swelling, voice change, SOB, and all other sxs at this time. No further complaints or concerns at this time.     Arrival mode: Personal vehicle    PCP: POLINA Dodson       Past Medical History:  Past Medical History:   Diagnosis Date    Anxiety     Hyperlipidemia        Past Surgical History:  Past Surgical History:   Procedure Laterality Date     SECTION      HYSTERECTOMY      has partial cervix and ovaries left     "     Family History:  Family history reviewed not relevant      Social History:  Social History     Social History Main Topics    Smoking status: Current Every Day Smoker     Packs/day: 0.50     Types: Cigarettes    Smokeless tobacco: Never Used    Alcohol use No    Drug use: No    Sexual activity: Unknown       ROS   Review of Systems   Constitutional: Negative for chills and fever.   HENT: Negative for congestion, sore throat, trouble swallowing and voice change.    Respiratory: Negative for cough and shortness of breath.    Cardiovascular: Negative for chest pain.   Gastrointestinal: Negative for abdominal pain, nausea and vomiting.   Genitourinary: Negative for dysuria and hematuria.   Musculoskeletal: Negative for back pain, neck pain and neck stiffness.   Skin: Positive for rash. Negative for pallor and wound.   Neurological: Negative for weakness, light-headedness and headaches.   Hematological: Does not bruise/bleed easily.     Physical Exam      Initial Vitals [10/26/17 1455]   BP Pulse Resp Temp SpO2   103/68 102 20 100.3 °F (37.9 °C) 98 %      MAP       79.67          Physical Exam  Nursing Notes and Vital Signs Reviewed.  Constitutional: Patient is in no acute distress. Well-developed and well-nourished.  Head: Atraumatic. Normocephalic.  Eyes: PERRL. EOM intact. Conjunctivae are not pale. No scleral icterus.  ENT: Mucous membranes are moist. Oropharynx is clear and symmetric.    Neck: Supple. Full ROM. No lymphadenopathy.  Cardiovascular: Regular rate. Regular rhythm.  Pulmonary/Chest: No respiratory distress.  Abdominal: Soft and non-distended.  There is no tenderness.  Musculoskeletal: Moves all extremities. No obvious deformities. 1 cm area of induration to RLE with no fluctuance or erythema.   Skin: Warm and dry.  Neurological:  Alert, awake, and appropriate.  Normal speech.  No acute focal neurological deficits are appreciated.  Psychiatric: Normal affect. Good eye contact. Appropriate in  "content.    ED Course    Procedures  ED Vital Signs:  Vitals:    10/26/17 1455 10/26/17 1655   BP: 103/68 104/63   Pulse: 102 80   Resp: 20 18   Temp: 100.3 °F (37.9 °C)    TempSrc: Oral    SpO2: 98% 99%   Weight: 44.9 kg (98 lb 15.8 oz)    Height: 4' 11" (1.499 m)        Abnormal Lab Results:  Labs Reviewed   CBC W/ AUTO DIFFERENTIAL - Abnormal; Notable for the following:        Result Value    Mono% 15.9 (*)     All other components within normal limits   COMPREHENSIVE METABOLIC PANEL - Abnormal; Notable for the following:     Alkaline Phosphatase 188 (*)      (*)      (*)     All other components within normal limits   URINALYSIS - Abnormal; Notable for the following:     Specific Gravity, UA <=1.005 (*)     Occult Blood UA Trace (*)     All other components within normal limits   CULTURE, BLOOD   CULTURE, BLOOD   LACTIC ACID, PLASMA        All Lab Results:  Results for orders placed or performed during the hospital encounter of 10/26/17   CBC auto differential   Result Value Ref Range    WBC 6.10 3.90 - 12.70 K/uL    RBC 4.58 4.00 - 5.40 M/uL    Hemoglobin 13.8 12.0 - 16.0 g/dL    Hematocrit 40.6 37.0 - 48.5 %    MCV 89 82 - 98 fL    MCH 30.1 27.0 - 31.0 pg    MCHC 34.0 32.0 - 36.0 g/dL    RDW 13.3 11.5 - 14.5 %    Platelets 190 150 - 350 K/uL    MPV 9.3 9.2 - 12.9 fL    Gran # 3.2 1.8 - 7.7 K/uL    Lymph # 1.9 1.0 - 4.8 K/uL    Mono # 1.0 0.3 - 1.0 K/uL    Eos # 0.0 0.0 - 0.5 K/uL    Baso # 0.03 0.00 - 0.20 K/uL    Gran% 53.1 38.0 - 73.0 %    Lymph% 30.5 18.0 - 48.0 %    Mono% 15.9 (H) 4.0 - 15.0 %    Eosinophil% 0.0 0.0 - 8.0 %    Basophil% 0.5 0.0 - 1.9 %    Differential Method Automated    Comprehensive metabolic panel   Result Value Ref Range    Sodium 139 136 - 145 mmol/L    Potassium 3.9 3.5 - 5.1 mmol/L    Chloride 101 95 - 110 mmol/L    CO2 27 23 - 29 mmol/L    Glucose 98 70 - 110 mg/dL    BUN, Bld 13 6 - 20 mg/dL    Creatinine 0.8 0.5 - 1.4 mg/dL    Calcium 9.8 8.7 - 10.5 mg/dL    Total " Protein 7.7 6.0 - 8.4 g/dL    Albumin 3.7 3.5 - 5.2 g/dL    Total Bilirubin 0.5 0.1 - 1.0 mg/dL    Alkaline Phosphatase 188 (H) 55 - 135 U/L     (H) 10 - 40 U/L     (H) 10 - 44 U/L    Anion Gap 11 8 - 16 mmol/L    eGFR if African American >60 >60 mL/min/1.73 m^2    eGFR if non African American >60 >60 mL/min/1.73 m^2   Urinalysis   Result Value Ref Range    Specimen UA Urine, Clean Catch     Color, UA Yellow Yellow, Straw, Ivy    Appearance, UA Clear Clear    pH, UA 6.0 5.0 - 8.0    Specific Gravity, UA <=1.005 (A) 1.005 - 1.030    Protein, UA Negative Negative    Glucose, UA Negative Negative    Ketones, UA Negative Negative    Bilirubin (UA) Negative Negative    Occult Blood UA Trace (A) Negative    Nitrite, UA Negative Negative    Urobilinogen, UA Negative <2.0 EU/dL    Leukocytes, UA Negative Negative   Lactic acid, plasma   Result Value Ref Range    Lactate (Lactic Acid) 1.7 0.5 - 2.2 mmol/L            The Emergency Provider reviewed the vital signs and test results, which are outlined above.    ED Discussion     5:23 PM: Reassessed pt at this time. Pt is awake, alert, and in NAD. Pt states her condition has improved at this time. Discussed with pt all pertinent ED information and results. Discussed pt dx of cellulitis of right lower extremity and plan of tx. Gave pt all f/u and return to the ED instructions. All questions and concerns were addressed at this time. Pt expresses understanding of information and instructions, and is comfortable with plan to discharge. Pt is stable for discharge.    I discussed with patient and/or family/caretaker that evaluation in the ED does not suggest any emergent or life threatening medical conditions requiring immediate intervention beyond what was provided in the ED, and I believe patient is safe for discharge.  Regardless, an unremarkable evaluation in the ED does not preclude the development or presence of a serious of life threatening condition. As such,  patient was instructed to return immediately for any worsening or change in current symptoms.      ED Medication(s):  Medications - No data to display    New Prescriptions    No medications on file             Medical Decision Making    Medical Decision Making:   Clinical Tests:   Lab Tests: Ordered and Reviewed           Scribe Attestation:   Scribe #1: I performed the above scribed service and the documentation accurately describes the services I performed. I attest to the accuracy of the note.    Attending:   Physician Attestation Statement for Scribe #1: I, Jose Cason MD, personally performed the services described in this documentation, as scribed by Lorenzo Sawant, in my presence, and it is both accurate and complete.          Clinical Impression       ICD-10-CM ICD-9-CM   1. Cellulitis of right lower extremity L03.115 682.6       Disposition:   Disposition: Discharged  Condition: Stable         Jose Cason MD  10/26/17 1744

## 2017-10-31 LAB
BACTERIA BLD CULT: NORMAL
BACTERIA BLD CULT: NORMAL

## 2018-04-02 RX ORDER — FLUTICASONE PROPIONATE 50 MCG
SPRAY, SUSPENSION (ML) NASAL
Qty: 1 BOTTLE | Refills: 12 | Status: SHIPPED | OUTPATIENT
Start: 2018-04-02 | End: 2019-05-01 | Stop reason: SDUPTHER

## 2018-07-16 ENCOUNTER — OFFICE VISIT (OUTPATIENT)
Dept: OTOLARYNGOLOGY | Facility: CLINIC | Age: 48
End: 2018-07-16
Payer: MEDICAID

## 2018-07-16 ENCOUNTER — CLINICAL SUPPORT (OUTPATIENT)
Dept: AUDIOLOGY | Facility: CLINIC | Age: 48
End: 2018-07-16
Payer: MEDICAID

## 2018-07-16 VITALS
TEMPERATURE: 98 F | HEART RATE: 84 BPM | DIASTOLIC BLOOD PRESSURE: 70 MMHG | SYSTOLIC BLOOD PRESSURE: 110 MMHG | BODY MASS INDEX: 20.66 KG/M2 | WEIGHT: 102.31 LBS

## 2018-07-16 DIAGNOSIS — H91.90 HEARING LOSS, UNSPECIFIED HEARING LOSS TYPE, UNSPECIFIED LATERALITY: Primary | ICD-10-CM

## 2018-07-16 DIAGNOSIS — H90.41 SENSORINEURAL HEARING LOSS (SNHL) OF RIGHT EAR WITH UNRESTRICTED HEARING OF LEFT EAR: Primary | ICD-10-CM

## 2018-07-16 PROCEDURE — 99212 OFFICE O/P EST SF 10 MIN: CPT | Mod: PBBFAC,PO,25 | Performed by: OTOLARYNGOLOGY

## 2018-07-16 PROCEDURE — 99214 OFFICE O/P EST MOD 30 MIN: CPT | Mod: S$PBB,,, | Performed by: OTOLARYNGOLOGY

## 2018-07-16 PROCEDURE — 92557 COMPREHENSIVE HEARING TEST: CPT | Mod: PBBFAC,PO | Performed by: AUDIOLOGIST-HEARING AID FITTER

## 2018-07-16 PROCEDURE — 99999 PR PBB SHADOW E&M-EST. PATIENT-LVL II: CPT | Mod: PBBFAC,,, | Performed by: OTOLARYNGOLOGY

## 2018-07-16 PROCEDURE — 92567 TYMPANOMETRY: CPT | Mod: PBBFAC,PO | Performed by: AUDIOLOGIST-HEARING AID FITTER

## 2018-07-16 NOTE — PROGRESS NOTES
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  Patient complains of decreased hearing in her right ear only over that last few months. She reports intermittent tinnitus bilaterally. She denies otalgia, vertigo, and aural fullness today.    Results reveal normal hearing 250-6000 Hz gently sloping to a mild indeterminate hearing loss at 8000 Hz for the right ear, and normal hearing 250-8000 Hz bilaterally.  Speech Reception Thresholds were  10 dBHL for the right ear and 10 dBHL for the left ear.   Word recognition scores were excellent for the right ear and excellent for the left ear.   Tympanograms were Type A for the right ear and Type A for the left ear.  Patient was counseled on the above findings.    Recommendations:  1. Annual Audiograms  2. Hearing protection in noise.

## 2018-07-16 NOTE — PROGRESS NOTES
Subjective:   Patient: Ami Boogie [id number], :1970   Visit date:2018 2:05 PM    Chief Complaint:  Other (not hearing well in the Right ear.)    HPI:  Ami is a 47 y.o. female who is here for evaluation of decreased hearing in her right ear x several months. She was last here in 2017, audiogram at that time was normal.   Hearing Loss:    She describes a right sided hearing loss starting about 1 month ago and has been unchanged.      The patient reports the following risk factors for hearing loss (Negative unless checked off):   [] Familial deafness   [] Ototoxic medication exposure  [] Acoustic trauma  []  Occupational exposure  [] Head injury or trauma  [] Otologic infection  [] History of meningitis  [] Ear surgery (other than pediatric tympanostomy tubes)  [] Metabolic disease      Severity: moderate  Quality: muffled  Modifying factors:  None  Associated symptoms include   [] Vertigo  [] Tinnitus  [] Otalgia  [] Drainage or pain   Previous treatments include none.          Review of Systems:  -     Allergic/Immunologic: is allergic to asa [aspirin]; augmentin [amoxicillin-pot clavulanate]; bactrim [sulfamethoxazole-trimethoprim]; ciprofloxacin; keflex [cephalexin]; and sulfa (sulfonamide antibiotics)..  -     Constitutional: Current temp: 97.9 °F (36.6 °C) (Tympanic)    Her meds, allergies, medical, surgical, social & family histories were reviewed & updated:  -     She has a current medication list which includes the following prescription(s): atorvastatin, clonazepam, and fluticasone.  -     She  has a past medical history of Anxiety and Hyperlipidemia.   -     She  does not have a problem list on file.   -     She  has a past surgical history that includes Hysterectomy and  section.  -     She  reports that she has been smoking Cigarettes.  She has been smoking about 0.50 packs per day. She has never used smokeless tobacco. She reports that she does not drink alcohol or use  drugs.  -     Her family history is not on file.  -     She is allergic to asa [aspirin]; augmentin [amoxicillin-pot clavulanate]; bactrim [sulfamethoxazole-trimethoprim]; ciprofloxacin; keflex [cephalexin]; and sulfa (sulfonamide antibiotics).    Objective:     Physical Exam:  Vitals:  /70   Pulse 84   Temp 97.9 °F (36.6 °C) (Tympanic)   Wt 46.4 kg (102 lb 4.7 oz)   BMI 20.66 kg/m²   Communication:  Able to communicate, no hoarseness.  Head & Face:  Normocephalic, atraumatic, no sinus tenderness.  Eyes:  Extraocular motions intact.  Ears:  Otoscopy of external auditory canals and tympanic membranes was normal, clinical speech reception thresholds grossly intact, no mass/lesion of auricle.  Nose:  No masses/lesions of external nose, nasal mucosa, septum, and turbinates were within normal limits.  Mouth:  No mass/lesion of lips, teeth, gums, hard/soft palate, tongue, tonsils, or oropharynx.  Neck & Lymphatics:  No cervical lymphadenopathy, no neck mass/crepitus/ asymmetry, trachea is midline, no thyroid enlargement/tenderness/mass.  Neuro/Psych: Alert with normal mood and affect.   Respiration/Chest:  Symmetric expansion during respiration, normal respiratory effort.  Skin:  Warm and intact.    [place] and [place]:  Midline,+AU    Assessment & Plan:   Ami was seen today for other.    Diagnoses and all orders for this visit:    Hearing loss, unspecified hearing loss type, unspecified laterality      -HEARING LOSS-  I spent a considerable amount of time educating the patient on hearing and hearing loss.  We discussed the basic characteristics of conductive hearing loss versus sensorineural hearing loss and the significant differences in treatment options between the two categories.      We discussed that in cases of conductive hearing loss, this suggests a mechanical disorder that sometimes can be improved with medications &/or surgery.  It may occur secondary to external ear pathology (atresia, otitis  externa, etc), tympanic membrane disorders (large perforations, immobility due to scarring or eustachian tube dysfunction), and middle ear disorders (effusions, ossicular disorders).    Sensorineural hearing loss is the expected hearing loss pattern with aging, but some disorders such as Meniere's may accelerate this process.  Additionally, amplification with hearing aids is generally the best option for hearing rehabilitation, except where the hearing loss is profound.  We discussed that this generally does not represent a dangerous condition, but in cases where there is a large discrepancy between the two ears in terms of nerve function, more investigation is often necessary due to the possiblity of vestibular schwannomas or meningiomas at the cerebellopontine angle.  The definitive test for this is an MRI with gadolinium.  However, these masses are usually very slow growing (1-2mm/year), so patients may elect to repeat an audiogram in about 6 months or obtain an ABR so long as they understand that this may result in a delay in diagnosis (although very unlikely that this would have a significant clinical impact on their outcome due to the slow growing nature of these masses) with the understanding that if ABR is abnormal or asymmetry increases, an MRI would then be required.    Ami  presents with what appears to be indeterminant hearing loss.  Based on this, my recommendation is audiogram.

## 2018-07-29 ENCOUNTER — HOSPITAL ENCOUNTER (EMERGENCY)
Facility: HOSPITAL | Age: 48
Discharge: HOME | End: 2018-07-29
Attending: EMERGENCY MEDICINE
Payer: MEDICAID

## 2018-07-29 VITALS
HEART RATE: 94 BPM | SYSTOLIC BLOOD PRESSURE: 121 MMHG | BODY MASS INDEX: 21.02 KG/M2 | HEIGHT: 59 IN | TEMPERATURE: 98 F | OXYGEN SATURATION: 99 % | WEIGHT: 104.25 LBS | RESPIRATION RATE: 20 BRPM | DIASTOLIC BLOOD PRESSURE: 74 MMHG

## 2018-07-29 DIAGNOSIS — B96.89 BACTERIAL VAGINOSIS: Primary | ICD-10-CM

## 2018-07-29 DIAGNOSIS — N76.0 BACTERIAL VAGINOSIS: Primary | ICD-10-CM

## 2018-07-29 LAB
ALBUMIN SERPL BCP-MCNC: 4.7 G/DL
ALP SERPL-CCNC: 88 U/L
ALT SERPL W/O P-5'-P-CCNC: 31 U/L
ANION GAP SERPL CALC-SCNC: 12 MMOL/L
AST SERPL-CCNC: 24 U/L
B-HCG UR QL: NEGATIVE
BACTERIA GENITAL QL WET PREP: ABNORMAL
BASOPHILS # BLD AUTO: 0.04 K/UL
BASOPHILS NFR BLD: 0.4 %
BILIRUB SERPL-MCNC: 0.2 MG/DL
BILIRUB UR QL STRIP: NEGATIVE
BUN SERPL-MCNC: 13 MG/DL
CALCIUM SERPL-MCNC: 9.9 MG/DL
CHLORIDE SERPL-SCNC: 103 MMOL/L
CLARITY UR REFRACT.AUTO: CLEAR
CLUE CELLS VAG QL WET PREP: ABNORMAL
CO2 SERPL-SCNC: 25 MMOL/L
COLOR UR AUTO: YELLOW
CREAT SERPL-MCNC: 0.8 MG/DL
DIFFERENTIAL METHOD: ABNORMAL
EOSINOPHIL # BLD AUTO: 0.2 K/UL
EOSINOPHIL NFR BLD: 2.1 %
ERYTHROCYTE [DISTWIDTH] IN BLOOD BY AUTOMATED COUNT: 13.3 %
EST. GFR  (AFRICAN AMERICAN): >60 ML/MIN/1.73 M^2
EST. GFR  (NON AFRICAN AMERICAN): >60 ML/MIN/1.73 M^2
FILAMENT FUNGI VAG WET PREP-#/AREA: ABNORMAL
GLUCOSE SERPL-MCNC: 86 MG/DL
GLUCOSE UR QL STRIP: NEGATIVE
HCT VFR BLD AUTO: 43.3 %
HGB BLD-MCNC: 15.3 G/DL
HGB UR QL STRIP: NEGATIVE
KETONES UR QL STRIP: NEGATIVE
LEUKOCYTE ESTERASE UR QL STRIP: NEGATIVE
LYMPHOCYTES # BLD AUTO: 5.2 K/UL
LYMPHOCYTES NFR BLD: 46.8 %
MCH RBC QN AUTO: 31 PG
MCHC RBC AUTO-ENTMCNC: 35.3 G/DL
MCV RBC AUTO: 88 FL
MONOCYTES # BLD AUTO: 1 K/UL
MONOCYTES NFR BLD: 9.4 %
NEUTROPHILS # BLD AUTO: 4.6 K/UL
NEUTROPHILS NFR BLD: 40.8 %
NITRITE UR QL STRIP: NEGATIVE
PH UR STRIP: 7 [PH] (ref 5–8)
PLATELET # BLD AUTO: 292 K/UL
PMV BLD AUTO: 8.7 FL
POTASSIUM SERPL-SCNC: 4.1 MMOL/L
PROT SERPL-MCNC: 8.7 G/DL
PROT UR QL STRIP: NEGATIVE
RBC # BLD AUTO: 4.93 M/UL
SODIUM SERPL-SCNC: 140 MMOL/L
SP GR UR STRIP: 1.01 (ref 1–1.03)
SPECIMEN SOURCE: ABNORMAL
T VAGINALIS GENITAL QL WET PREP: ABNORMAL
URN SPEC COLLECT METH UR: NORMAL
UROBILINOGEN UR STRIP-ACNC: NEGATIVE EU/DL
WBC # BLD AUTO: 11.1 K/UL
WBC #/AREA VAG WET PREP: ABNORMAL
YEAST GENITAL QL WET PREP: ABNORMAL

## 2018-07-29 PROCEDURE — 96360 HYDRATION IV INFUSION INIT: CPT

## 2018-07-29 PROCEDURE — 25000003 PHARM REV CODE 250: Performed by: EMERGENCY MEDICINE

## 2018-07-29 PROCEDURE — 87491 CHLMYD TRACH DNA AMP PROBE: CPT

## 2018-07-29 PROCEDURE — 87210 SMEAR WET MOUNT SALINE/INK: CPT

## 2018-07-29 PROCEDURE — 81025 URINE PREGNANCY TEST: CPT

## 2018-07-29 PROCEDURE — 80053 COMPREHEN METABOLIC PANEL: CPT

## 2018-07-29 PROCEDURE — 81003 URINALYSIS AUTO W/O SCOPE: CPT

## 2018-07-29 PROCEDURE — 85025 COMPLETE CBC W/AUTO DIFF WBC: CPT

## 2018-07-29 PROCEDURE — 99283 EMERGENCY DEPT VISIT LOW MDM: CPT | Mod: 25

## 2018-07-29 RX ORDER — METRONIDAZOLE 500 MG/1
500 TABLET ORAL 3 TIMES DAILY
Qty: 21 TABLET | Refills: 0 | Status: SHIPPED | OUTPATIENT
Start: 2018-07-29 | End: 2018-08-05

## 2018-07-29 RX ADMIN — SODIUM CHLORIDE 1000 ML: 0.9 INJECTION, SOLUTION INTRAVENOUS at 09:07

## 2018-07-30 LAB
C TRACH DNA SPEC QL NAA+PROBE: NOT DETECTED
N GONORRHOEA DNA SPEC QL NAA+PROBE: NOT DETECTED

## 2018-07-30 NOTE — ED NOTES
LOC: The patient is awake, alert and aware of environment with an appropriate affect, the patient is oriented x 3 and speaking appropriately.  APPEARANCE: Patient resting comfortably and in no acute distress, patient is clean and well groomed, patient's clothing is properly fastened.  HEENT: Brief WNL  SKIN: Brief WNL.   MUSCULOSKELETAL: Brief WNL. Generalized weakness, pt ambulating around room without difficulty  RESPIRATORY: Brief WNL  CARDIAC: Brief WNL  GASTRO: Brief WNL  : Brief WNL  Peripheral Vasc: Brief WNL  NEURO: Brief WNL  PSYCH: Brief WNL

## 2018-07-30 NOTE — ED PROVIDER NOTES
Encounter Date: 2018       History     Chief Complaint   Patient presents with    Fatigue     Patient to [facility] with the complaint of weakness. She reports a biospy on tu and now she repotrts having a slight  yellow discharge and a smell. She also reports [employer] changed the manufacture of her clonazepam and she is not sure if she is having issues due to being a different company.      The history is provided by the patient.   Fatigue   This is a new problem. The current episode started 2 days ago. The problem occurs constantly. The problem has not changed since onset.Pertinent negatives include no chest pain, no abdominal pain, no headaches and no shortness of breath. Nothing aggravates the symptoms. Nothing relieves the symptoms. She has tried nothing for the symptoms. The treatment provided no relief.   Pt with recent cervical bx reports yellow discharge. Pt also reports weakness and back pain.    Review of patient's allergies indicates:   Allergen Reactions    Asa [aspirin]     Augmentin [amoxicillin-pot clavulanate] Other (See Comments)     Pt states she can take amoxicillin alone, but cannot tolerate Augmentin, it eroded her esophagus.    Bactrim [sulfamethoxazole-trimethoprim] Hives and Swelling    Ciprofloxacin     Keflex [cephalexin]      dizziness    Sulfa (sulfonamide antibiotics) Other (See Comments)     Yeast infection     Past Medical History:   Diagnosis Date    Abnormal Pap smear of cervix     Anxiety     COPD (chronic obstructive pulmonary disease)     mild    Hyperlipidemia      Past Surgical History:   Procedure Laterality Date     SECTION      HYSTERECTOMY      has partial cervix and ovaries left     No family history on file.  Social History   Substance Use Topics    Smoking status: Current Every Day Smoker     Packs/day: 0.50     Types: Cigarettes    Smokeless tobacco: Never Used    Alcohol use No     Review of Systems   Constitutional: Positive for fatigue.    Respiratory: Negative for shortness of breath.    Cardiovascular: Negative for chest pain.   Gastrointestinal: Negative for abdominal pain.   Genitourinary: Positive for vaginal discharge.   Musculoskeletal: Positive for back pain.   Neurological: Positive for weakness. Negative for headaches.   All other systems reviewed and are negative.      Physical Exam     Initial Vitals [07/29/18 2046]   BP Pulse Resp Temp SpO2   125/76 97 18 98.2 °F (36.8 °C) 98 %      MAP       --         Physical Exam    Nursing note and vitals reviewed.  Constitutional: She appears well-developed and well-nourished. No distress.   HENT:   Head: Normocephalic and atraumatic.   Mouth/Throat: Oropharynx is clear and moist.   Eyes: Conjunctivae and EOM are normal. Pupils are equal, round, and reactive to light.   Neck: Normal range of motion. Neck supple.   Cardiovascular: Normal rate, regular rhythm and normal heart sounds.   Pulmonary/Chest: Breath sounds normal. No respiratory distress.   Abdominal: Soft. Bowel sounds are normal. She exhibits no distension. There is no tenderness. There is no rebound and no guarding.   Genitourinary: Vagina normal. Pelvic exam was performed with patient supine. Uterus is not tender. Cervix exhibits no discharge. Right adnexum displays no mass and no tenderness. Left adnexum displays no mass and no tenderness. No bleeding in the vagina. No foreign body in the vagina. No vaginal discharge found.   Musculoskeletal: Normal range of motion.   Neurological: She is alert and oriented to person, place, and time. She has normal strength.   Skin: Skin is warm and dry.   Psychiatric: She has a normal mood and affect. Thought content normal.         ED Course   Procedures  Labs Reviewed   CBC W/ AUTO DIFFERENTIAL - Abnormal; Notable for the following:        Result Value    MPV 8.7 (*)     Lymph # 5.2 (*)     All other components within normal limits   COMPREHENSIVE METABOLIC PANEL - Abnormal; Notable for the  following:     Total Protein 8.7 (*)     All other components within normal limits   VAGINAL SCREEN - Abnormal; Notable for the following:     Clue Cells, Wet Prep Moderate (*)     WBC - Vaginal Screen Occasional (*)     Bacteria - Vaginal Screen Many (*)     All other components within normal limits   C. TRACHOMATIS/N. GONORRHOEAE BY AMP DNA   URINALYSIS   PREGNANCY TEST, URINE RAPID     Results for orders placed or performed during the hospital encounter of 07/29/18   CBC auto differential   Result Value Ref Range    WBC 11.10 3.90 - 12.70 K/uL    RBC 4.93 4.00 - 5.40 M/uL    Hemoglobin 15.3 12.0 - 16.0 g/dL    Hematocrit 43.3 37.0 - 48.5 %    MCV 88 82 - 98 fL    MCH 31.0 27.0 - 31.0 pg    MCHC 35.3 32.0 - 36.0 g/dL    RDW 13.3 11.5 - 14.5 %    Platelets 292 150 - 350 K/uL    MPV 8.7 (L) 9.2 - 12.9 fL    Gran # (ANC) 4.6 1.8 - 7.7 K/uL    Lymph # 5.2 (H) 1.0 - 4.8 K/uL    Mono # 1.0 0.3 - 1.0 K/uL    Eos # 0.2 0.0 - 0.5 K/uL    Baso # 0.04 0.00 - 0.20 K/uL    Gran% 40.8 38.0 - 73.0 %    Lymph% 46.8 18.0 - 48.0 %    Mono% 9.4 4.0 - 15.0 %    Eosinophil% 2.1 0.0 - 8.0 %    Basophil% 0.4 0.0 - 1.9 %    Differential Method Automated    Comprehensive metabolic panel   Result Value Ref Range    Sodium 140 136 - 145 mmol/L    Potassium 4.1 3.5 - 5.1 mmol/L    Chloride 103 95 - 110 mmol/L    CO2 25 23 - 29 mmol/L    Glucose 86 70 - 110 mg/dL    BUN, Bld 13 6 - 20 mg/dL    Creatinine 0.8 0.5 - 1.4 mg/dL    Calcium 9.9 8.7 - 10.5 mg/dL    Total Protein 8.7 (H) 6.0 - 8.4 g/dL    Albumin 4.7 3.5 - 5.2 g/dL    Total Bilirubin 0.2 0.1 - 1.0 mg/dL    Alkaline Phosphatase 88 55 - 135 U/L    AST 24 10 - 40 U/L    ALT 31 10 - 44 U/L    Anion Gap 12 8 - 16 mmol/L    eGFR if African American >60.0 >60 mL/min/1.73 m^2    eGFR if non African American >60.0 >60 mL/min/1.73 m^2   Urinalysis   Result Value Ref Range    Specimen UA Urine, Clean Catch     Color, UA Yellow Yellow, Straw, Ivy    Appearance, UA Clear Clear    pH, UA 7.0 5.0  - 8.0    Specific Gravity, UA 1.010 1.005 - 1.030    Protein, UA Negative Negative    Glucose, UA Negative Negative    Ketones, UA Negative Negative    Bilirubin (UA) Negative Negative    Occult Blood UA Negative Negative    Nitrite, UA Negative Negative    Urobilinogen, UA Negative <2.0 EU/dL    Leukocytes, UA Negative Negative   Pregnancy, urine rapid   Result Value Ref Range    Preg Test, Ur Negative    Vaginal Screen Vagina   Result Value Ref Range    Trichomonas None None    Clue Cells, Wet Prep Moderate (A) None    Budding Yeast None None    Fungal Hyphae None None    WBC - Vaginal Screen Occasional (A) None    Bacteria - Vaginal Screen Many (A) None    Wet Prep Source Vagina None            Imaging Results    None     10:36 PM - Counseling: Spoke with the patient and discussed todays findings, in addition to providing specific details for the plan of care and counseling regarding the diagnosis and prognosis. Questions are answered at this time. I discussed with patient and/or family/caretaker that evaluation in the ED does not suggest any emergent or life threatening medical conditions requiring immediate intervention beyond what was provided in the ED, and I believe patient is safe for discharge.  Regardless, an unremarkable evaluation in the ED does not preclude the development or presence of a serious of life threatening condition. As such, patient was instructed to return immediately for any worsening or change in current symptoms.                              Clinical Impression:   The encounter diagnosis was Bacterial vaginosis.      Disposition:   Disposition: Discharged  Condition: Stable                        Abdulaziz Baca MD  07/29/18 3161

## 2019-01-21 ENCOUNTER — HOSPITAL ENCOUNTER (EMERGENCY)
Facility: HOSPITAL | Age: 49
Discharge: HOME | End: 2019-01-21
Attending: SPECIALIST
Payer: MEDICAID

## 2019-01-21 VITALS
HEART RATE: 76 BPM | SYSTOLIC BLOOD PRESSURE: 146 MMHG | WEIGHT: 112.19 LBS | OXYGEN SATURATION: 98 % | BODY MASS INDEX: 22.62 KG/M2 | TEMPERATURE: 98 F | HEIGHT: 59 IN | DIASTOLIC BLOOD PRESSURE: 85 MMHG | RESPIRATION RATE: 18 BRPM

## 2019-01-21 DIAGNOSIS — S30.1XXA CONTUSION OF FLANK, INITIAL ENCOUNTER: Primary | ICD-10-CM

## 2019-01-21 LAB
ALBUMIN SERPL BCP-MCNC: 4.5 G/DL
ALP SERPL-CCNC: 90 U/L
ALT SERPL W/O P-5'-P-CCNC: 41 U/L
ANION GAP SERPL CALC-SCNC: 9 MMOL/L
AST SERPL-CCNC: 26 U/L
BASOPHILS # BLD AUTO: 0.05 K/UL
BASOPHILS NFR BLD: 0.4 %
BILIRUB SERPL-MCNC: 0.5 MG/DL
BILIRUB UR QL STRIP: NEGATIVE
BUN SERPL-MCNC: 12 MG/DL
CALCIUM SERPL-MCNC: 9.8 MG/DL
CHLORIDE SERPL-SCNC: 102 MMOL/L
CLARITY UR: CLEAR
CO2 SERPL-SCNC: 25 MMOL/L
COLOR UR: YELLOW
CREAT SERPL-MCNC: 0.7 MG/DL
DIFFERENTIAL METHOD: ABNORMAL
EOSINOPHIL # BLD AUTO: 0.1 K/UL
EOSINOPHIL NFR BLD: 1.2 %
ERYTHROCYTE [DISTWIDTH] IN BLOOD BY AUTOMATED COUNT: 13.1 %
EST. GFR  (AFRICAN AMERICAN): >60 ML/MIN/1.73 M^2
EST. GFR  (NON AFRICAN AMERICAN): >60 ML/MIN/1.73 M^2
GLUCOSE SERPL-MCNC: 108 MG/DL
GLUCOSE UR QL STRIP: NEGATIVE
HCT VFR BLD AUTO: 44.2 %
HGB BLD-MCNC: 15.2 G/DL
HGB UR QL STRIP: NEGATIVE
KETONES UR QL STRIP: NEGATIVE
LEUKOCYTE ESTERASE UR QL STRIP: NEGATIVE
LYMPHOCYTES # BLD AUTO: 4 K/UL
LYMPHOCYTES NFR BLD: 34.2 %
MCH RBC QN AUTO: 30.4 PG
MCHC RBC AUTO-ENTMCNC: 34.4 G/DL
MCV RBC AUTO: 88 FL
MONOCYTES # BLD AUTO: 0.9 K/UL
MONOCYTES NFR BLD: 7.8 %
NEUTROPHILS # BLD AUTO: 6.6 K/UL
NEUTROPHILS NFR BLD: 56.4 %
NITRITE UR QL STRIP: NEGATIVE
PH UR STRIP: 6 [PH] (ref 5–8)
PLATELET # BLD AUTO: 266 K/UL
PMV BLD AUTO: 8.3 FL
POTASSIUM SERPL-SCNC: 4.2 MMOL/L
PROT SERPL-MCNC: 7.7 G/DL
PROT UR QL STRIP: NEGATIVE
RBC # BLD AUTO: 5 M/UL
SODIUM SERPL-SCNC: 136 MMOL/L
SP GR UR STRIP: <=1.005 (ref 1–1.03)
URN SPEC COLLECT METH UR: ABNORMAL
UROBILINOGEN UR STRIP-ACNC: NEGATIVE EU/DL
WBC # BLD AUTO: 11.77 K/UL

## 2019-01-21 PROCEDURE — 25000003 PHARM REV CODE 250: Performed by: REGISTERED NURSE

## 2019-01-21 PROCEDURE — 85025 COMPLETE CBC W/AUTO DIFF WBC: CPT

## 2019-01-21 PROCEDURE — 81003 URINALYSIS AUTO W/O SCOPE: CPT

## 2019-01-21 PROCEDURE — 80053 COMPREHEN METABOLIC PANEL: CPT

## 2019-01-21 PROCEDURE — 99283 EMERGENCY DEPT VISIT LOW MDM: CPT

## 2019-01-21 RX ORDER — HYDROCODONE BITARTRATE AND ACETAMINOPHEN 5; 325 MG/1; MG/1
1 TABLET ORAL
Status: COMPLETED | OUTPATIENT
Start: 2019-01-21 | End: 2019-01-21

## 2019-01-21 RX ORDER — DICLOFENAC SODIUM 50 MG/1
50 TABLET, DELAYED RELEASE ORAL 2 TIMES DAILY
Qty: 20 TABLET | Refills: 0 | Status: SHIPPED | OUTPATIENT
Start: 2019-01-21

## 2019-01-21 RX ADMIN — HYDROCODONE BITARTRATE AND ACETAMINOPHEN 1 TABLET: 5; 325 TABLET ORAL at 12:01

## 2019-01-21 NOTE — ED PROVIDER NOTES
HISTORY     Chief Complaint   Patient presents with    Hip Pain     patient states she hit a counter top hurting her left hip 6 days while cleaning, c/o L hip pain      Review of patient's allergies indicates:   Allergen Reactions    Asa [aspirin]     Augmentin [amoxicillin-pot clavulanate] Other (See Comments)     Pt states she can take amoxicillin alone, but cannot tolerate Augmentin, it eroded her esophagus.    Bactrim [sulfamethoxazole-trimethoprim] Hives and Swelling    Ciprofloxacin     Keflex [cephalexin]      dizziness    Sulfa (sulfonamide antibiotics) Other (See Comments)     Yeast infection        HPI   The history is provided by the patient.   Hip Pain   This is a new problem. The current episode started more than 1 week ago. The problem occurs constantly. The problem has not changed since onset.Pertinent negatives include no chest pain, no abdominal pain and no shortness of breath. The symptoms are aggravated by bending and twisting. Nothing relieves the symptoms.        PCP: Franny Matthews MD     Past Medical History:  Past Medical History:   Diagnosis Date    Abnormal Pap smear of cervix     Anxiety     COPD (chronic obstructive pulmonary disease)     mild    Hyperlipidemia         Past Surgical History:  Past Surgical History:   Procedure Laterality Date     SECTION      HYSTERECTOMY      has partial cervix and ovaries left        Family History:  No family history on file.     Social History:  Social History     Tobacco Use    Smoking status: Current Every Day Smoker     Packs/day: 0.50     Types: Cigarettes    Smokeless tobacco: Never Used   Substance and Sexual Activity    Alcohol use: No    Drug use: No    Sexual activity: Not on file         ROS   Review of Systems   Constitutional: Negative for fever.   HENT: Negative for sore throat.    Respiratory: Negative for shortness of breath.    Cardiovascular: Negative for chest pain.   Gastrointestinal: Negative for  "abdominal pain and nausea.   Genitourinary: Negative for dysuria.   Musculoskeletal: Negative for back pain.        + L hip pain     Skin: Negative for rash.   Neurological: Negative for weakness.   Hematological: Does not bruise/bleed easily.   All other systems reviewed and are negative.      PHYSICAL EXAM     Initial Vitals [01/21/19 1146]   BP Pulse Resp Temp SpO2   (!) 146/85 76 18 98.2 °F (36.8 °C) 98 %      MAP       --           Physical Exam    Constitutional: She appears well-developed and well-nourished. She is not diaphoretic. No distress.   HENT:   Head: Normocephalic and atraumatic.   Eyes: Conjunctivae and EOM are normal. Pupils are equal, round, and reactive to light.   Neck: Normal range of motion. Neck supple.   Cardiovascular: Normal rate, regular rhythm and normal heart sounds.   No murmur heard.  Pulmonary/Chest: Breath sounds normal. No respiratory distress. She has no wheezes. She has no rales.   Abdominal: Soft. Bowel sounds are normal. There is no tenderness. There is no rigidity, no rebound, no guarding and no CVA tenderness.       Pt difficult to examine, grabbing my hand continuously, tender over L flank with small area of ecchymosis    Musculoskeletal: Normal range of motion. She exhibits no edema or tenderness.   Neurological: She is alert and oriented to person, place, and time. No cranial nerve deficit. GCS score is 15. GCS eye subscore is 4. GCS verbal subscore is 5. GCS motor subscore is 6.   Skin: Skin is warm and dry. Capillary refill takes less than 2 seconds.   Psychiatric: She has a normal mood and affect. Thought content normal.          ED COURSE   Procedures  ED ONGOING VITALS:  Vitals:    01/21/19 1146   BP: (!) 146/85   Pulse: 76   Resp: 18   Temp: 98.2 °F (36.8 °C)   SpO2: 98%   Weight: 50.9 kg (112 lb 3.4 oz)   Height: 4' 11" (1.499 m)         ABNORMAL LAB VALUES:  Labs Reviewed   URINALYSIS, REFLEX TO URINE CULTURE - Abnormal; Notable for the following components:       " Result Value    Specific Gravity, UA <=1.005 (*)     All other components within normal limits    Narrative:     Preferred Collection Type->Urine, Clean Catch   CBC W/ AUTO DIFFERENTIAL - Abnormal; Notable for the following components:    MPV 8.3 (*)     All other components within normal limits   COMPREHENSIVE METABOLIC PANEL         ALL LAB VALUES:  Results for orders placed or performed during the hospital encounter of 01/21/19   Urinalysis, Reflex to Urine Culture Urine, Clean Catch   Result Value Ref Range    Specimen UA Urine, Clean Catch     Color, UA Yellow Yellow, Straw, Ivy    Appearance, UA Clear Clear    pH, UA 6.0 5.0 - 8.0    Specific Gravity, UA <=1.005 (A) 1.005 - 1.030    Protein, UA Negative Negative    Glucose, UA Negative Negative    Ketones, UA Negative Negative    Bilirubin (UA) Negative Negative    Occult Blood UA Negative Negative    Nitrite, UA Negative Negative    Urobilinogen, UA Negative <2.0 EU/dL    Leukocytes, UA Negative Negative   CBC auto differential   Result Value Ref Range    WBC 11.77 3.90 - 12.70 K/uL    RBC 5.00 4.00 - 5.40 M/uL    Hemoglobin 15.2 12.0 - 16.0 g/dL    Hematocrit 44.2 37.0 - 48.5 %    MCV 88 82 - 98 fL    MCH 30.4 27.0 - 31.0 pg    MCHC 34.4 32.0 - 36.0 g/dL    RDW 13.1 11.5 - 14.5 %    Platelets 266 150 - 350 K/uL    MPV 8.3 (L) 9.2 - 12.9 fL    Gran # (ANC) 6.6 1.8 - 7.7 K/uL    Lymph # 4.0 1.0 - 4.8 K/uL    Mono # 0.9 0.3 - 1.0 K/uL    Eos # 0.1 0.0 - 0.5 K/uL    Baso # 0.05 0.00 - 0.20 K/uL    Gran% 56.4 38.0 - 73.0 %    Lymph% 34.2 18.0 - 48.0 %    Mono% 7.8 4.0 - 15.0 %    Eosinophil% 1.2 0.0 - 8.0 %    Basophil% 0.4 0.0 - 1.9 %    Differential Method Automated    Comprehensive metabolic panel   Result Value Ref Range    Sodium 136 136 - 145 mmol/L    Potassium 4.2 3.5 - 5.1 mmol/L    Chloride 102 95 - 110 mmol/L    CO2 25 23 - 29 mmol/L    Glucose 108 70 - 110 mg/dL    BUN, Bld 12 6 - 20 mg/dL    Creatinine 0.7 0.5 - 1.4 mg/dL    Calcium 9.8 8.7 - 10.5  mg/dL    Total Protein 7.7 6.0 - 8.4 g/dL    Albumin 4.5 3.5 - 5.2 g/dL    Total Bilirubin 0.5 0.1 - 1.0 mg/dL    Alkaline Phosphatase 90 55 - 135 U/L    AST 26 10 - 40 U/L    ALT 41 10 - 44 U/L    Anion Gap 9 8 - 16 mmol/L    eGFR if African American >60 >60 mL/min/1.73 m^2    eGFR if non African American >60 >60 mL/min/1.73 m^2           RADIOLOGY STUDIES:  Imaging Results    None                   The above vital signs and test results have been reviewed by the emergency provider.     ED Medications:  Current Discharge Medication List      Medication List      START taking these medications    diclofenac 50 MG EC tablet  Commonly known as:  VOLTAREN  Take 1 tablet (50 mg total) by mouth 2 (two) times daily.        ASK your doctor about these medications    atorvastatin 20 MG tablet  Commonly known as:  LIPITOR     clonazePAM 1 MG tablet  Commonly known as:  KLONOPIN  Take 1 tablet (1 mg total) by mouth every evening.     fluticasone 50 mcg/actuation nasal spray  Commonly known as:  FLONASE  INSTILL 2 SPRAYS INTRANASALY ONCE DAILY           Where to Get Your Medications      These medications were sent to [place] [address] [place] [facility] - [place], [place] - [address]  [address], [facility] [address]    Phone:  [phone] ·   diclofenac 50 MG EC tablet            Discharge Medications:  This SmartLink is deprecated. Use AVSMEDLIST instead to display the medication list for a patient.   Follow-up Information     Franny Matthews MD In 3 days.    Specialty:  Family Medicine  Contact information:  [address]  [address]  [facility] [id number] [phone]             [facility] - .    Specialty:  Emergency Medicine  Why:  If symptoms worsen  Contact information:  [address]  [facility] [address] [phone]               1:33 PM: Discussed with pt all pertinent ED information and results. Discussed pt dx and plan of tx. Gave pt all f/u and return to the ED  instructions. All questions and concerns were addressed at this time. Pt expresses understanding of information and instructions, and is comfortable with plan to discharge. Pt is stable for discharge.       I discussed with patient and/or family/caretaker that evaluation in the ED does not suggest any emergent or life threatening medical conditions requiring immediate intervention beyond what was provided in the ED, and I believe patient is safe for discharge. Regardless, an unremarkable evaluation in the ED does not preclude the development or presence of a serious or life threatening condition. As such, patient was instructed to return immediately for any worsening or change in current symptoms.        MEDICAL DECISION MAKING                 CLINICAL IMPRESSION       ICD-10-CM ICD-9-CM   1. Contusion of flank, initial encounter S30.1XXA 922.2               José Luis Kirkpatrick Jr., [facility]  01/21/19 2103

## 2019-05-01 RX ORDER — FLUTICASONE PROPIONATE 50 MCG
SPRAY, SUSPENSION (ML) NASAL
Qty: 16 ML | Refills: 11 | Status: SHIPPED | OUTPATIENT
Start: 2019-05-01 | End: 2020-08-17

## 2019-08-14 ENCOUNTER — HOSPITAL ENCOUNTER (EMERGENCY)
Facility: HOSPITAL | Age: 49
Discharge: HOME | End: 2019-08-14
Attending: EMERGENCY MEDICINE
Payer: MEDICAID

## 2019-08-14 VITALS
OXYGEN SATURATION: 94 % | TEMPERATURE: 99 F | BODY MASS INDEX: 24.27 KG/M2 | WEIGHT: 120.38 LBS | SYSTOLIC BLOOD PRESSURE: 138 MMHG | RESPIRATION RATE: 20 BRPM | DIASTOLIC BLOOD PRESSURE: 76 MMHG | HEIGHT: 59 IN | HEART RATE: 81 BPM

## 2019-08-14 DIAGNOSIS — J40 BRONCHITIS: ICD-10-CM

## 2019-08-14 DIAGNOSIS — J44.1 ACUTE EXACERBATION OF CHRONIC OBSTRUCTIVE PULMONARY DISEASE (COPD): Primary | ICD-10-CM

## 2019-08-14 DIAGNOSIS — Z72.0 TOBACCO ABUSE DISORDER: ICD-10-CM

## 2019-08-14 PROCEDURE — 63600175 PHARM REV CODE 636 W HCPCS: Performed by: EMERGENCY MEDICINE

## 2019-08-14 PROCEDURE — 99285 EMERGENCY DEPT VISIT HI MDM: CPT

## 2019-08-14 PROCEDURE — 25000242 PHARM REV CODE 250 ALT 637 W/ HCPCS: Performed by: EMERGENCY MEDICINE

## 2019-08-14 PROCEDURE — 94640 AIRWAY INHALATION TREATMENT: CPT

## 2019-08-14 PROCEDURE — 99284 EMERGENCY DEPT VISIT MOD MDM: CPT

## 2019-08-14 RX ORDER — PREDNISONE 20 MG/1
60 TABLET ORAL
Status: COMPLETED | OUTPATIENT
Start: 2019-08-14 | End: 2019-08-14

## 2019-08-14 RX ORDER — IPRATROPIUM BROMIDE AND ALBUTEROL SULFATE 2.5; .5 MG/3ML; MG/3ML
3 SOLUTION RESPIRATORY (INHALATION)
Status: DISPENSED | OUTPATIENT
Start: 2019-08-14 | End: 2019-08-14

## 2019-08-14 RX ORDER — PREDNISONE 20 MG/1
40 TABLET ORAL DAILY
Qty: 10 TABLET | Refills: 0 | Status: SHIPPED | OUTPATIENT
Start: 2019-08-14 | End: 2019-08-19

## 2019-08-14 RX ORDER — ALBUTEROL SULFATE 90 UG/1
1-2 AEROSOL, METERED RESPIRATORY (INHALATION) EVERY 6 HOURS PRN
Qty: 1 INHALER | Refills: 0 | Status: SHIPPED | OUTPATIENT
Start: 2019-08-14 | End: 2020-08-13

## 2019-08-14 RX ADMIN — IPRATROPIUM BROMIDE AND ALBUTEROL SULFATE 3 ML: .5; 3 SOLUTION RESPIRATORY (INHALATION) at 07:08

## 2019-08-14 RX ADMIN — PREDNISONE 60 MG: 20 TABLET ORAL at 07:08

## 2019-08-15 NOTE — ED PROVIDER NOTES
SCRIBE #1 NOTE: I, Justine Orr, am scribing for, and in the presence of, Molly Bennett MD. I have scribed the entire note.       History     Chief Complaint   Patient presents with    General Illness     pt reports she was dx with URI 2 days ago and placed on medications but is not feeling any better yet; cough, congestion, anxiety; reports she finished zpack today and dr placed on levaquin but has not started yet     Review of patient's allergies indicates:   Allergen Reactions    Asa [aspirin]     Augmentin [amoxicillin-pot clavulanate] Other (See Comments)     Pt states she can take amoxicillin alone, but cannot tolerate Augmentin, it eroded her esophagus.    Bactrim [sulfamethoxazole-trimethoprim] Hives and Swelling    Ciprofloxacin     Keflex [cephalexin]      dizziness    Sulfa (sulfonamide antibiotics) Other (See Comments)     Yeast infection         History of Present Illness     HPI    8/14/2019, 7:06 PM  History obtained from the  and patient      History of Present Illness: Ami Boogie is a 49 y.o. female patient with a PMHx of HLD, anxiety, and COPD who presents to the Emergency Department for evaluation of a general illness which onset gradually a few days ago. Pt was seen at [facility] ED on 8/12 and dx with a URI and d/c with Tessalon, Claritin-D, Promethazine-DM, and Flonase with no relief. Pt went to [facility] 4 days ago where he was given a steroid shot and Azithromycin which pt finished taking today. Symptoms are constant and moderate in severity. No mitigating or exacerbating factors reported. Associated sxs include cough and congestion. Patient denies any fever, chills, sore throat, SOB, wheezing, CP, leg swelling, palpitations, abd pain, n/v, dizziness, extremity weakness/numbness, and all other sxs at this time. Pt reports she has not began taking her Levaquin due to picking it up earlier today. Pt is an everyday smoker. No further complaints or concerns at this time.        Arrival mode: Personal vehicle    PCP: Franny Matthews MD        Past Medical History:  Past Medical History:   Diagnosis Date    Abnormal Pap smear of cervix     Anxiety     COPD (chronic obstructive pulmonary disease)     mild    Hyperlipidemia        Past Surgical History:  Past Surgical History:   Procedure Laterality Date     SECTION      HYSTERECTOMY      has partial cervix and ovaries left         Family History:  History reviewed. No pertinent family history.    Social History:  Social History     Tobacco Use    Smoking status: Current Every Day Smoker     Packs/day: 0.50     Types: Cigarettes    Smokeless tobacco: Never Used   Substance and Sexual Activity    Alcohol use: No    Drug use: No    Sexual activity: Unknown        Review of Systems     Review of Systems   Constitutional: Negative for chills and fever.   HENT: Positive for congestion. Negative for rhinorrhea and sore throat.    Respiratory: Positive for cough. Negative for chest tightness, shortness of breath and wheezing.    Cardiovascular: Negative for chest pain, palpitations and leg swelling.   Gastrointestinal: Negative for abdominal pain, diarrhea, nausea and vomiting.   Genitourinary: Negative for dysuria, frequency and urgency.   Skin: Negative for rash.   Neurological: Negative for dizziness, weakness and numbness.   All other systems reviewed and are negative.       Physical Exam     Initial Vitals [19]   BP Pulse Resp Temp SpO2   138/76 100 18 99 °F (37.2 °C) 95 %      MAP       --          Physical Exam  Nursing Notes and Vital Signs Reviewed.  Constitutional: Patient is in no acute distress. Well-developed and well-nourished.  Head: Atraumatic. Normocephalic.  Eyes: PERRL. EOM intact. Conjunctivae are not pale. No scleral icterus.  ENT: Mucous membranes are moist. Oropharynx is clear and symmetric.    Neck: Supple. Full ROM. No lymphadenopathy.  Cardiovascular: Regular rate. Regular rhythm. No  "murmurs, rubs, or gallops. Distal pulses are 2+ and symmetric.  Pulmonary/Chest: No respiratory distress. Rhonchi. Expiratory wheezes.  Abdominal: Soft and non-distended.  There is no tenderness.  No rebound, guarding, or rigidity.   Musculoskeletal: Moves all extremities. No obvious deformities. No edema.   Skin: Warm and dry.  Neurological:  Alert, awake, and appropriate.  Normal speech.  No acute focal neurological deficits are appreciated.  Psychiatric: Anxious. Good eye contact. Appropriate in content.     ED Course   Procedures  ED Vital Signs:  Vitals:    08/14/19 1814 08/14/19 1924 08/14/19 1928 08/14/19 2001   BP: 138/76      Pulse: 100 73 80 81   Resp: 18 20 20 20   Temp: 99 °F (37.2 °C)      TempSrc: Oral      SpO2: 95% 100% 100% (!) 94%   Weight: 54.6 kg (120 lb 5.9 oz)      Height: 4' 11" (1.499 m)          Abnormal Lab Results:  Labs Reviewed - No data to display       Imaging Results:  Imaging Results    None       Result Narrative   XR CHEST 2 VIEW PA AND LATERAL    PA and lateral chest    Clinical history: shortness of breath    The lungs are clear. Heart size and mediastinal contours and pulmonary vasculature appear normal.    Impression: Negative study.   Other Result Information   Interface, Rad Results In - 08/12/2019  4:16 PM CDT  XR CHEST 2 VIEW PA AND LATERAL    PA and lateral chest    Clinical history: shortness of breath    The lungs are clear. Heart size and mediastinal contours and pulmonary vasculature appear normal.    Impression: Negative study.       The Emergency Provider reviewed the vital signs and test results, which are outlined above.     ED Discussion     7:30 PM: Reassessed pt at this time. Discussed with pt all pertinent ED information and results. Discussed pt dx and plan of tx. Gave pt all f/u and return to the ED instructions. All questions and concerns were addressed at this time. Pt expresses understanding of information and instructions, and is comfortable with plan to " discharge. Pt is stable for discharge.    I discussed with patient and/or family/caretaker that evaluation in the ED does not suggest any emergent or life threatening medical conditions requiring immediate intervention beyond what was provided in the ED, and I believe patient is safe for discharge.  Regardless, an unremarkable evaluation in the ED does not preclude the development or presence of a serious of life threatening condition. As such, patient was instructed to return immediately for any worsening or change in current symptoms.      ED Medication(s):  Medications   albuterol-ipratropium 2.5 mg-0.5 mg/3 mL nebulizer solution 3 mL (3 mLs Nebulization Given 8/14/19 1928)   predniSONE tablet 60 mg (60 mg Oral Given 8/14/19 1950)       Discharge Medication List as of 8/14/2019  7:32 PM      START taking these medications    Details   albuterol (PROVENTIL/VENTOLIN HFA) 90 mcg/actuation inhaler Inhale 1-2 puffs into the lungs every 6 (six) hours as needed for Wheezing. Rescue, Starting Wed 8/14/2019, Until Thu 8/13/2020, Print      predniSONE (DELTASONE) 20 MG tablet Take 2 tablets (40 mg total) by mouth once daily. for 5 days, Starting Wed 8/14/2019, Until Mon 8/19/2019, Print             Follow-up Information     Franny Matthews MD. Schedule an appointment as soon as possible for a visit in 1 day.    Specialty:  Family Medicine  Why:  Return to the Emergency Room, If symptoms worsen  Contact information:  [address]  [facility] [address]  [phone]                         Medical Decision Making:   Clinical Tests:   Radiological Study: Reviewed     Additional MDM:   Smoking Cessation: The patient is a smoker. The patient was counseled on smoking cessation for: 3 minutes. The patient was counseled on hazards of smoking while driving. The patient was counseled on the adverse effects of second hand smoke. The patient was counseled on how exercise will aide in tobacco cessation. The patient was  counseled on tobacco related  health complications.          Scribe Attestation:   Scribe #1: I performed the above scribed service and the documentation accurately describes the services I performed. I attest to the accuracy of the note.     Attending:   Physician Attestation Statement for Scribe #1: I, Molly Bennett MD, personally performed the services described in this documentation, as scribed by Justine Orr, in my presence, and it is both accurate and complete.           Clinical Impression       ICD-10-CM ICD-9-CM   1. Acute exacerbation of chronic obstructive pulmonary disease (COPD) J44.1 491.21   2. Bronchitis J40 490   3. Tobacco abuse disorder Z72.0 305.1       Disposition:   Disposition: Discharged  Condition: Stable         Molly Bennett MD  08/26/19 100

## 2019-10-08 ENCOUNTER — HOSPITAL ENCOUNTER (EMERGENCY)
Facility: HOSPITAL | Age: 49
Discharge: HOME | End: 2019-10-08
Attending: EMERGENCY MEDICINE
Payer: MEDICAID

## 2019-10-08 VITALS
BODY MASS INDEX: 27.42 KG/M2 | TEMPERATURE: 99 F | SYSTOLIC BLOOD PRESSURE: 136 MMHG | HEART RATE: 86 BPM | RESPIRATION RATE: 20 BRPM | WEIGHT: 136 LBS | OXYGEN SATURATION: 95 % | DIASTOLIC BLOOD PRESSURE: 88 MMHG | HEIGHT: 59 IN

## 2019-10-08 DIAGNOSIS — M79.641 RIGHT HAND PAIN: Primary | ICD-10-CM

## 2019-10-08 DIAGNOSIS — M79.642 LEFT HAND PAIN: ICD-10-CM

## 2019-10-08 PROCEDURE — 99284 EMERGENCY DEPT VISIT MOD MDM: CPT | Mod: 25

## 2019-10-08 RX ORDER — METHYLPREDNISOLONE 4 MG/1
TABLET ORAL
Qty: 1 PACKAGE | Refills: 0 | Status: SHIPPED | OUTPATIENT
Start: 2019-10-08 | End: 2019-10-29

## 2019-10-08 RX ORDER — DICLOFENAC SODIUM 50 MG/1
50 TABLET, DELAYED RELEASE ORAL 2 TIMES DAILY
Qty: 20 TABLET | Refills: 0 | Status: SHIPPED | OUTPATIENT
Start: 2019-10-08

## 2019-10-08 NOTE — ED PROVIDER NOTES
Encounter Date: 10/8/2019       History     Chief Complaint   Patient presents with    Hand Pain     bilateral hand pain- thinks she injuried it while walking dog on leash     The history is provided by the patient.     Review of patient's allergies indicates:   Allergen Reactions    Asa [aspirin]     Augmentin [amoxicillin-pot clavulanate] Other (See Comments)     Pt states she can take amoxicillin alone, but cannot tolerate Augmentin, it eroded her esophagus.    Bactrim [sulfamethoxazole-trimethoprim] Hives and Swelling    Ciprofloxacin     Keflex [cephalexin]      dizziness    Sulfa (sulfonamide antibiotics) Other (See Comments)     Yeast infection     Past Medical History:   Diagnosis Date    Abnormal Pap smear of cervix     Anxiety     COPD (chronic obstructive pulmonary disease)     mild    Hyperlipidemia      Past Surgical History:   Procedure Laterality Date     SECTION      HYSTERECTOMY      has partial cervix and ovaries left     History reviewed. No pertinent family history.  Social History     Tobacco Use    Smoking status: Current Every Day Smoker     Packs/day: 0.50     Types: Cigarettes    Smokeless tobacco: Never Used   Substance Use Topics    Alcohol use: No    Drug use: No     Review of Systems    Physical Exam     Initial Vitals [10/08/19 1355]   BP Pulse Resp Temp SpO2   136/88 86 20 98.7 °F (37.1 °C) 95 %      MAP       --         Physical Exam    Nursing note and vitals reviewed.  Constitutional: Vital signs are normal. She appears well-developed and well-nourished. No distress.   HENT:   Head: Normocephalic and atraumatic.   Right Ear: External ear normal.   Left Ear: External ear normal.   Nose: Nose normal.   Mouth/Throat: Oropharynx is clear and moist.   Eyes: Conjunctivae, EOM and lids are normal. Pupils are equal, round, and reactive to light.   Neck: Normal range of motion and full passive range of motion without pain. Neck supple.   Cardiovascular: Normal rate,  regular rhythm, S1 normal, S2 normal, normal heart sounds, intact distal pulses and normal pulses.   Pulmonary/Chest: Breath sounds normal. No respiratory distress. She has no wheezes. She has no rales.   Abdominal: Soft. Normal appearance and bowel sounds are normal. She exhibits no distension. There is no tenderness.   Musculoskeletal:        Right hand: She exhibits decreased range of motion, tenderness and bony tenderness. Normal sensation noted. Normal strength noted.        Left hand: She exhibits decreased range of motion, tenderness and bony tenderness. She exhibits normal capillary refill, no deformity and no laceration. Normal sensation noted. Normal strength noted.   Lymphadenopathy:     She has no cervical adenopathy.   Neurological: She is alert and oriented to person, place, and time. She has normal strength. No cranial nerve deficit or sensory deficit. Coordination and gait normal.   Skin: Skin is warm, dry and intact.   Psychiatric: She has a normal mood and affect. Her speech is normal and behavior is normal. Judgment and thought content normal. Cognition and memory are normal.         ED Course   Procedures  Labs Reviewed - No data to display       Imaging Results          X-Ray Hand 3 View Bilateral (Final result)  Result time 10/08/19 14:52:53    Final result by Hernan Rose MD (10/08/19 14:52:53)                 Impression:      No acute abnormality identified.      Electronically signed by: Hernan Rose  Date:    10/08/2019  Time:    14:52             Narrative:    EXAMINATION:  XR HAND COMPLETE 3 VIEWS BILATERAL    CLINICAL HISTORY:  hand pain;.    TECHNIQUE:  PA, lateral, and oblique views of both hands were performed.    COMPARISON:  None    FINDINGS:  Left hand:    No evidence of fracture or dislocation.  Joint spaces appear well maintained.  Mild radiocarpal joint space narrowing.  Soft tissues unremarkable.    Right hand:    No evidence of fracture or dislocation. Joint spaces appear  well maintained. Mild radiocarpal joint space narrowing. Soft tissues unremarkable.                                                      Clinical Impression:       ICD-10-CM ICD-9-CM   1. Right hand pain M79.641 729.5   2. Left hand pain M79.642 729.5         Disposition:   Disposition: Discharged  Condition: Stable                        YOU Jonas  10/08/19 1844

## 2019-10-08 NOTE — ED NOTES
Pt states she was walking her dog on a leash when he took off running after a cat, injuring her (R) hand. Noted bruising/ swelling to palm of hand. Radial pulses present.  Patient identifiers verified and correct for Ami Boogie.  LOC: The patient is awake, alert and aware of environment with an appropriate affect, the patient is oriented x 3 and speaking appropriately.  APPEARANCE: Patient resting comfortably and in no acute distress, patient is clean and well groomed, patient's clothing is properly fastened.  SKIN: The skin is warm and dry, color consistent with ethnicity, patient has normal skin turgor and moist mucus membranes, skin intact.  MUSCULOSKELETAL: Patient moving all extremities spontaneously.  RESPIRATORY: Airway is open and patent, respirations are spontaneous.  CARDIAC: Patient has a normal rate, no periphreal edema noted, capillary refill < 3 seconds.  ABDOMEN: Soft and non tender to palpation.

## 2021-08-21 ENCOUNTER — HOSPITAL ENCOUNTER (EMERGENCY)
Facility: HOSPITAL | Age: 51
Discharge: HOME | End: 2021-08-21
Attending: EMERGENCY MEDICINE
Payer: MEDICAID

## 2021-08-21 VITALS
RESPIRATION RATE: 18 BRPM | SYSTOLIC BLOOD PRESSURE: 117 MMHG | HEART RATE: 76 BPM | OXYGEN SATURATION: 98 % | HEIGHT: 59 IN | TEMPERATURE: 98 F | DIASTOLIC BLOOD PRESSURE: 79 MMHG | WEIGHT: 146.63 LBS | BODY MASS INDEX: 29.56 KG/M2

## 2021-08-21 DIAGNOSIS — Z20.822 ENCOUNTER FOR LABORATORY TESTING FOR COVID-19 VIRUS: Primary | ICD-10-CM

## 2021-08-21 DIAGNOSIS — Z20.822 LAB TEST NEGATIVE FOR COVID-19 VIRUS: ICD-10-CM

## 2021-08-21 LAB
CTP QC/QA: YES
SARS-COV-2 RDRP RESP QL NAA+PROBE: NEGATIVE

## 2021-08-21 PROCEDURE — U0002 COVID-19 LAB TEST NON-CDC: HCPCS | Performed by: NURSE PRACTITIONER

## 2021-08-21 PROCEDURE — 99282 EMERGENCY DEPT VISIT SF MDM: CPT

## 2022-08-24 ENCOUNTER — TELEPHONE (OUTPATIENT)
Dept: PSYCHIATRY | Facility: CLINIC | Age: 52
End: 2022-08-24
Payer: MEDICAID

## 2022-08-24 NOTE — TELEPHONE ENCOUNTER
Pt was advised to call [facility] [place] to place her with a new provider.  Pt was inquiring about getting an appointment with Dr. Younger.

## 2022-08-26 ENCOUNTER — TELEPHONE (OUTPATIENT)
Dept: PSYCHIATRY | Facility: CLINIC | Age: 52
End: 2022-08-26
Payer: MEDICAID

## 2022-08-26 NOTE — TELEPHONE ENCOUNTER
Pt called to schedule with Dr. Younger. I provided her with [facility] number to call but she only wants to see Dr. Younger she will  Check back in a few weeks to see if she is accepting pt

## 2022-11-28 ENCOUNTER — TELEPHONE (OUTPATIENT)
Dept: PSYCHIATRY | Facility: CLINIC | Age: 52
End: 2022-11-28
Payer: MEDICAID

## 2022-11-28 NOTE — TELEPHONE ENCOUNTER
rt pt call back. pt wanting to schedule with Dr. Younger. pt referral coming from [facility]. informed pt we are not accepting new external pt. pt said she has been seeing Dr. Younger for the past few years and now being told she can't. Apologized to the pt for the inconvenience. Pt asked for my name to document. Pt asked to speak with supervisor for better understanding of why she can not see Dr. Younger at [facility]. Provided supervisor with pt info for further assistance.  ----- Message from Tamar Choudhary sent at 11/28/2022 12:38 PM CST -----  Contact: [phone]  Pt is calling in regards to the status of her referral. Please call pt back at [phone]. Thanks KB

## 2023-01-27 ENCOUNTER — NURSE TRIAGE (OUTPATIENT)
Dept: ADMINISTRATIVE | Facility: CLINIC | Age: 53
End: 2023-01-27
Payer: MEDICAID

## 2023-01-28 NOTE — TELEPHONE ENCOUNTER
Caller states that she is a pre diabetic and ate bread and french fries from [employer] today, caller states that her cbg is currently 206. Caller advised home care. Caller states that she will go to ED if blood sugar level continue to increase.  Pt advised per protocol and verbalized understanding.   Reason for Disposition   Blood glucose  mg/dL (3.9 -13.3 mmol/L)    Additional Information   Negative: Unconscious or difficult to awaken   Negative: Acting confused (e.g., disoriented, slurred speech)   Negative: Very weak (e.g., can't stand)   Negative: Sounds like a life-threatening emergency to the triager   Negative: [1] Vomiting AND [2] signs of dehydration (e.g., very dry mouth, lightheaded, dark urine)   Negative: [1] Blood glucose > 240 mg/dL (13.3 mmol/L) AND [2] rapid breathing   Negative: Blood glucose > 500 mg/dL (27.8 mmol/L)   Negative: [1] Blood glucose > 240 mg/dL (13.3 mmol/L) AND [2] urine ketones moderate-large (or more than 1+)   Negative: [1] Blood glucose > 240 mg/dL (13.3 mmol/L) AND [2] blood ketones > 1.4 mmol/L   Negative: [1] Blood glucose > 240 mg/dL (13.3 mmol/L) AND [2] vomiting AND [3] unable to check for ketones (in blood or urine)   Negative: [1] New-onset diabetes suspected (e.g., frequent urination, weak, weight loss) AND [2] vomiting or rapid breathing   Negative: Vomiting lasts > 4 hours   Negative: Patient sounds very sick or weak to the triager   Negative: Blood glucose > 400 mg/dL (22.2 mmol/L)   Negative: [1] Blood glucose > 300 mg/dL (16.7 mmol/L) AND [2] two or more times in a row   Negative: Urine ketones moderate - large (or blood ketones > 1.4 mmol/L)   Negative: [1] Caller has URGENT medication or insulin pump question AND [2] triager unable to answer question   Negative: [1] Symptoms of high blood sugar (e.g., frequent urination, weak, weight loss) AND [2] not able to test blood glucose   Negative: New-onset diabetes suspected (e.g., frequent urination, weakness, weight  loss)   Negative: [1] Caller has NON-URGENT medication or insulin pump question AND [2] triager unable to answer question    Protocols used: Diabetes - High Blood Sugar-A-AH

## 2023-10-24 ENCOUNTER — HOSPITAL ENCOUNTER (EMERGENCY)
Facility: HOSPITAL | Age: 53
Discharge: HOME | End: 2023-10-24
Attending: EMERGENCY MEDICINE
Payer: MEDICAID

## 2023-10-24 VITALS
RESPIRATION RATE: 18 BRPM | SYSTOLIC BLOOD PRESSURE: 154 MMHG | BODY MASS INDEX: 31.84 KG/M2 | TEMPERATURE: 98 F | HEART RATE: 85 BPM | OXYGEN SATURATION: 97 % | WEIGHT: 157.63 LBS | DIASTOLIC BLOOD PRESSURE: 91 MMHG

## 2023-10-24 DIAGNOSIS — G47.00 INSOMNIA, UNSPECIFIED TYPE: Primary | ICD-10-CM

## 2023-10-24 PROCEDURE — 99204 PR OFFICE/OUTPT VISIT, NEW, LEVL IV, 45-59 MIN: ICD-10-PCS | Mod: 95,AF,HB, | Performed by: PSYCHIATRY & NEUROLOGY

## 2023-10-24 PROCEDURE — 99283 EMERGENCY DEPT VISIT LOW MDM: CPT

## 2023-10-24 PROCEDURE — 99204 OFFICE O/P NEW MOD 45 MIN: CPT | Mod: 95,AF,HB, | Performed by: PSYCHIATRY & NEUROLOGY

## 2023-10-24 RX ORDER — MIRTAZAPINE 15 MG/1
15 TABLET, FILM COATED ORAL NIGHTLY PRN
Qty: 20 TABLET | Refills: 0 | Status: SHIPPED | OUTPATIENT
Start: 2023-10-24 | End: 2024-10-23

## 2023-10-25 NOTE — CONSULTS
"[facility]  Psychiatry  Telepsychiatry Consult Note    Please see previous notes:    Patient agreeable to consultation via telepsychiatry.    Tele-Consultation from Psychiatry started: 10/24/2023 at 9:45pm  The chief complaint leading to psychiatric consultation is: depression  This consultation was requested by the Emergency Department attending physician.  The location of the consulting psychiatrist is  [place] .  The patient location is  [facility] EMERGENCY DEPARTMENT   The patient arrived at the ED at: [facility]  Also present with the patient at the time of the consultation: nobody    Patient Identification:   Ami Boogie is a 53 y.o. female.    Patient information was obtained from patient and past medical records.  Patient presented to the Emergency Department     Consults  Teleconsult Time Documentation  Subjective:     History of Present Illness:  54yo female with hx of anxiety and depression presents with sleep disturbance.       Per ED note- "   Brief history of present illness:  Patient reports her baby was recently taken away from her given up for adoption.  Patient reports she feels depressed and sad.  Patient tearful on exam.  Patient denies SI or HI     Vitals       Vitals:     10/24/23 1849   BP: (!) 154/91   BP Location: Right arm   Patient Position: Sitting   Pulse: 85   Resp: 18   Temp: 98.4 °F (36.9 °C)   TempSrc: Oral   SpO2: 97%   Weight: 71.5 kg (157 lb 10.1 oz)            Pertinent physical exam:  Tearful     Brief workup plan:  Tele psych consult     Preliminary workup initiated; this workup will be continued and followed by the physician or advanced practice provider that is assigned to the patient when roomed."    On interview, patient reports she is taking lexapro 40mg po daily and vistaril, reported trouble with sleeping which worsened in recent weeks in context of stress.. Apparently psychologist told her to go to the ED in order to get a referral. Patient reports trouble " "falling and staying asleep.  Patient reports some worry about her granddaughter getting put up for adoption.   Vistaril 50mg partially effective at best.  Reports she started to develop insomnia at age 46 after having having bilateral oopherectomy. Worsening in context of worrying about her granddaughter.  Sleeps 4-5 hours per night, feels tired during the day, some impaired focus.  No psychotic sx noted  No SI or HI  No hx of snoring  No manic sx noted  During the interview patient was focused on her stressors. Several times she indicated that valium has worked in past for [employer]  This is the extent of patients complaints at this time  12 pt ros was  negative aside from sx noted above      Psychiatric History:   Previous Psychiatric Hospitalizations: No   Previous Medication Trials: klonopin and valium for sleep, trazodone- made her feel funny  Previous Suicide Attempts: no   History of Violence: no  History of Depression: no  History of Luma: no  History of Auditory/Visual Hallucination no  History of Delusions: no  Outpatient psychiatrist (current & past): No    Substance Abuse History:  Tobacco:No  Alcohol: No  Illicit Substances:No  Detox/Rehab: No    Legal History: Past charges/incarcerations: no    Family Psychiatric History: son- addiction issue      Social History:  Single, domiciled, takes care of 73 yo male    Psychiatric Mental Status Exam:  Arousal: alert  Sensorium/Orientation: oriented to grossly intact  Behavior/Cooperation: normal, cooperative   Speech: normal tone, normal rate, normal pitch, normal volume  Language: grossly intact, not tested  Mood: " okay "   Affect: appropriate  Thought Process: circumstantial  Thought Content:   Auditory hallucinations: NO  Visual hallucinations: NO  Paranoia: NO  Delusions:  NO  Suicidal ideation: NO  Homicidal ideation: NO  Attention/Concentration:  intact  Memory:    Recent:  Intact   Remote: Intact     Fund of Knowledge: Aware of current events   Abstract " reasoning: similarities were abstract  Insight: intact  Judgment: intact      Past Medical History:   Past Medical History:   Diagnosis Date    Abnormal Pap smear of cervix     Anxiety     COPD (chronic obstructive pulmonary disease)     mild    Hyperlipidemia       Laboratory Data: Labs Reviewed - No data to display      Allergies:   Review of patient's allergies indicates:   Allergen Reactions    Asa [aspirin]     Augmentin [amoxicillin-pot clavulanate] Other (See Comments)     Pt states she can take amoxicillin alone, but cannot tolerate Augmentin, it eroded her esophagus.    Bactrim [sulfamethoxazole-trimethoprim] Hives and Swelling    Ciprofloxacin     Keflex [cephalexin]      dizziness    Sulfa (sulfonamide antibiotics) Other (See Comments)     Yeast infection       Medications in ER: Medications - No data to display    Medications at home: reviewed with patient and in MAR    No new subjective & objective note has been filed under this hospital service since the last note was generated.      Assessment - Diagnosis - Goals:     Diagnosis/Impression:   Adjustment insomnia  Hx of anxiety and depression    Rec:   Does not appear to be a PEC candidate  Consider adding remeron 15mg hs for sleep  Sleep hygiene was discussed  Informed consent provided  Please provide her with local mental health resources    Time with patient, coordinating care: 51min      More than 50% of the time was spent counseling/coordinating care    Consulting clinician was informed of the encounter and consult note.    Consultation ended: 10/24/2023 at 10:41pm    Tobias Ordonez MD  Psychiatry  [facility]

## 2023-10-25 NOTE — FIRST PROVIDER EVALUATION
Medical screening examination initiated.  I have conducted a focused provider triage encounter, findings are as follows:    Brief history of present illness:  Patient reports her baby was recently taken away from her given up for adoption.  Patient reports she feels depressed and sad.  Patient tearful on exam.  Patient denies SI or HI    Vitals:    10/24/23 1849   BP: (!) 154/91   BP Location: Right arm   Patient Position: Sitting   Pulse: 85   Resp: 18   Temp: 98.4 °F (36.9 °C)   TempSrc: Oral   SpO2: 97%   Weight: 71.5 kg (157 lb 10.1 oz)       Pertinent physical exam:  Tearful    Brief workup plan:  Tele psych consult    Preliminary workup initiated; this workup will be continued and followed by the physician or advanced practice provider that is assigned to the patient when roomed.

## 2023-10-25 NOTE — ED PROVIDER NOTES
"SCRIBE #1 NOTE: I, Cheyenne Diane, am scribing for, and in the presence of, Josué Coates Jr., MD. I have scribed the entire note.       History     Chief Complaint   Patient presents with    Depression     Pt states she has been depressed and having sleeping issues since her grand baby was born in early October and was subsequently given away by the mother for adoption. Pt states she has been feeling a lot of anger and resentment, pt states she cannot sleep. Pt states she is looking for a psychologist. Denies SI/HI currently     Review of patient's allergies indicates:   Allergen Reactions    Asa [aspirin]     Augmentin [amoxicillin-pot clavulanate] Other (See Comments)     Pt states she can take amoxicillin alone, but cannot tolerate Augmentin, it eroded her esophagus.    Bactrim [sulfamethoxazole-trimethoprim] Hives and Swelling    Ciprofloxacin     Keflex [cephalexin]      dizziness    Sulfa (sulfonamide antibiotics) Other (See Comments)     Yeast infection         History of Present Illness     HPI    10/24/2023, 10:45 PM  History obtained from the patient      History of Present Illness: Ami Boogie is a 53 y.o. female patient with a PMHx of anxiety and hyperlipidemia who presents to the Emergency Department for evaluation of depression which onset several days PTA. Symptoms are constant and moderate in severity. Pt states she has been unable to fall asleep and stay asleep. Her  grandchild was given up for adoption "illegally" and has been on edge since son and daughter-in-law are drug addicts and still care for her 4 year old grandchild. No associated sxs reported. Patient denies any self-injury, hallucinations, SI, HI, back pain, blood in stool, hematuria, dysuria, and all other sxs at this time. Pt is on 20 mg Lexapro and takes 50 mg Vistaril at night to sleep. She also came in the ED because she needs a referral to see Dr. Barbara Younger, who was previously at another hospital but is now " at [facility]. She denies using alcohol, tobacco products, or drugs. No further complaints or concerns at this time.       Arrival mode: Personal vehicle      PCP: Franny Matthews MD        Past Medical History:  Past Medical History:   Diagnosis Date    Abnormal Pap smear of cervix     Anxiety     COPD (chronic obstructive pulmonary disease)     mild    Hyperlipidemia        Past Surgical History:  Past Surgical History:   Procedure Laterality Date     SECTION      HYSTERECTOMY      has partial cervix and ovaries left         Family History:  No family history on file.    Social History:  Social History     Tobacco Use    Smoking status: Every Day     Current packs/day: 0.50     Types: Cigarettes    Smokeless tobacco: Never   Substance and Sexual Activity    Alcohol use: No    Drug use: No    Sexual activity: Not on file        Review of Systems     Review of Systems   Gastrointestinal:  Negative for blood in stool.   Genitourinary:  Negative for dysuria and hematuria.   Musculoskeletal:  Negative for back pain.   Psychiatric/Behavioral:  Positive for dysphoric mood and sleep disturbance. Negative for hallucinations, self-injury and suicidal ideas.         (-) HI      Physical Exam     Initial Vitals [10/24/23 1849]   BP Pulse Resp Temp SpO2   (!) 154/91 85 18 98.4 °F (36.9 °C) 97 %      MAP       --          Physical Exam  Nursing Notes and Vital Signs Reviewed.  Constitutional: Patient is in mild distress. Well-developed and well-nourished.  Head: Atraumatic. Normocephalic.  Eyes: PERRL. EOM intact. Conjunctivae are not pale. No scleral icterus.  ENT: Mucous membranes are moist. Oropharynx is clear and symmetric.    Neck: Supple. Full ROM. No lymphadenopathy.  Cardiovascular: Regular rate. Regular rhythm. No murmurs, rubs, or gallops. Distal pulses are 2+ and symmetric.  Pulmonary/Chest: No respiratory distress. Clear to auscultation bilaterally. No wheezing or rales.  Abdominal: Soft and non-distended.   There is no tenderness.  No rebound, guarding, or rigidity. Good bowel sounds.  Genitourinary: No CVA tenderness  Musculoskeletal: Moves all extremities. No obvious deformities. No edema. No calf tenderness.  Skin: Warm and dry.  Neurological:  Alert, awake, and appropriate.  Normal speech.  No acute focal neurological deficits are appreciated.  Psychiatric: Normal affect. Good eye contact. Appropriate in content.     ED Course   Procedures  ED Vital Signs:  Vitals:    10/24/23 1849   BP: (!) 154/91   Pulse: 85   Resp: 18   Temp: 98.4 °F (36.9 °C)   TempSrc: Oral   SpO2: 97%   Weight: 71.5 kg (157 lb 10.1 oz)       Abnormal Lab Results:  Labs Reviewed - No data to display     All Lab Results:      Imaging Results:  Imaging Results    None                The Emergency Provider reviewed the vital signs and test results, which are outlined above.     ED Discussion     10:57 PM: Reassessed pt at this time. Discussed with pt all pertinent ED information and results. Discussed pt dx and plan of tx. Gave pt all f/u and return to the ED instructions. All questions and concerns were addressed at this time. Pt expresses understanding of information and instructions, and is comfortable with plan to discharge. Pt is stable for discharge.    I discussed with patient and/or family/caretaker that evaluation in the ED does not suggest any emergent or life threatening medical conditions requiring immediate intervention beyond what was provided in the ED, and I believe patient is safe for discharge.  Regardless, an unremarkable evaluation in the ED does not preclude the development or presence of a serious of life threatening condition. As such, patient was instructed to return immediately for any worsening or change in current symptoms.    Driving or other activities under influence of medications - Patient and/or family/caretaker was given a prescription for, or instructed to use a medicine that may impair ability to drive, operate  machinery, or participate in other potentially dangerous activities.  Patient was instructed not to participate in these activities while under the influence of these medications.        Medical Decision Making  Risk  Prescription drug management.                ED Medication(s):  Medications - No data to display    Discharge Medication List as of 10/24/2023 10:56 PM        START taking these medications    Details   mirtazapine (REMERON) 15 MG tablet Take 1 tablet (15 mg total) by mouth nightly as needed (insomnia)., Starting Tue 10/24/2023, Until Wed 10/23/2024 at 2359, Print              Follow-up Information       Franny Matthews MD. Schedule an appointment as soon as possible for a visit in 1 week.    Specialty: Family Medicine  Contact information:  [address]  [address]  [facility] [address]  [phone]               [facility] - Emergency Dept..    Specialty: Emergency Medicine  Why: As needed, If symptoms worsen  Contact information:  [address]  [facility] [id number] [phone]                               Scribe Attestation:   Scribe #1: I performed the above scribed service and the documentation accurately describes the services I performed. I attest to the accuracy of the note.     Attending:   Physician Attestation Statement for Scribe #1: I, Josué Coates Jr., MD, personally performed the services described in this documentation, as scribed by Cheyenne Diane, in my presence, and it is both accurate and complete.           Clinical Impression       ICD-10-CM ICD-9-CM   1. Insomnia, unspecified type  G47.00 780.52       Disposition:   Disposition: Discharged  Condition: Stable         Josué Coates Jr., MD  10/25/23 0111

## 2023-10-31 ENCOUNTER — HOSPITAL ENCOUNTER (EMERGENCY)
Facility: HOSPITAL | Age: 53
Discharge: HOME | End: 2023-10-31
Attending: EMERGENCY MEDICINE
Payer: MEDICAID

## 2023-10-31 VITALS
OXYGEN SATURATION: 100 % | DIASTOLIC BLOOD PRESSURE: 82 MMHG | HEART RATE: 102 BPM | BODY MASS INDEX: 31.74 KG/M2 | WEIGHT: 157.44 LBS | TEMPERATURE: 98 F | RESPIRATION RATE: 20 BRPM | HEIGHT: 59 IN | SYSTOLIC BLOOD PRESSURE: 135 MMHG

## 2023-10-31 DIAGNOSIS — E11.9 TYPE 2 DIABETES MELLITUS WITHOUT COMPLICATION, UNSPECIFIED WHETHER LONG TERM INSULIN USE: ICD-10-CM

## 2023-10-31 DIAGNOSIS — F41.9 ANXIETY: Primary | ICD-10-CM

## 2023-10-31 LAB — POCT GLUCOSE: 147 MG/DL (ref 70–110)

## 2023-10-31 PROCEDURE — 99283 EMERGENCY DEPT VISIT LOW MDM: CPT

## 2023-10-31 PROCEDURE — 82962 GLUCOSE BLOOD TEST: CPT

## 2023-10-31 RX ORDER — DIAZEPAM 2 MG/1
2 TABLET ORAL DAILY PRN
Qty: 5 TABLET | Refills: 0 | Status: SHIPPED | OUTPATIENT
Start: 2023-10-31

## 2023-10-31 NOTE — ED PROVIDER NOTES
SCRIBE #1 NOTE: I, Cheyenne Diane, am scribing for, and in the presence of, Jermaine Sotelo MD. I have scribed the entire note.       History     Chief Complaint   Patient presents with    OTHER     Pt states he blood sugar was 205 at home and she is having dry mouth. In triage glucose was 147. Pt also tearful stating she is angry that her grandchild was given up for adoption and she is unable to sleep and needs medication.      Review of patient's allergies indicates:   Allergen Reactions    Asa [aspirin]     Augmentin [amoxicillin-pot clavulanate] Other (See Comments)     Pt states she can take amoxicillin alone, but cannot tolerate Augmentin, it eroded her esophagus.    Bactrim [sulfamethoxazole-trimethoprim] Hives and Swelling    Ciprofloxacin     Keflex [cephalexin]      dizziness    Sulfa (sulfonamide antibiotics) Other (See Comments)     Yeast infection         History of Present Illness     HPI    10/31/2023, 12:09 PM  History obtained from the patient      History of Present Illness: Ami Boogie is a 53 y.o. female patient with a PMHx of anxiety, COPD, and hyperlipidemia who presents to the Emergency Department for evaluation of anxiety which onset 3 weeks PTA. Symptoms are constant and moderate in severity. Pt was seen on 10/24 for same chief complaint. Her anxiety and insomnia has worsened since her grandchild was given up for adoption. She wants medicine to be able to sleep. She has tried Klonopin and Xanax before but neither of them worked. She is on 40 mg Lexapro as well as 50 mg hydroxyzine three times a day. She was on valium in the past and did not have problems with it. Her blood sugar was at 205 1 hour PTA. She does plan on driving later today. She is in the process of finding another PCP. No further complaints or concerns at this time.       Arrival mode: Personal vehicle      PCP: Franny Matthews MD        Past Medical History:  Past Medical History:   Diagnosis Date    Abnormal Pap  "smear of cervix     Anxiety     COPD (chronic obstructive pulmonary disease)     mild    Hyperlipidemia        Past Surgical History:  Past Surgical History:   Procedure Laterality Date     SECTION      HYSTERECTOMY      has partial cervix and ovaries left         Family History:  No family history on file.    Social History:  Social History     Tobacco Use    Smoking status: Every Day     Current packs/day: 0.50     Types: Cigarettes    Smokeless tobacco: Never   Substance and Sexual Activity    Alcohol use: No    Drug use: No    Sexual activity: Not on file        Review of Systems     Review of Systems   Psychiatric/Behavioral:  The patient is nervous/anxious.    All other systems reviewed and are negative.     Physical Exam     Initial Vitals [10/31/23 113]   BP Pulse Resp Temp SpO2   135/82 102 20 98.3 °F (36.8 °C) 100 %      MAP       --          Physical Exam  Nursing Notes and Vital Signs Reviewed.  Constitutional: Patient is in mild distress. Tearful.  Eyes: PERRL. EOM intact. Conjunctivae are not pale. No scleral icterus.  ENT: Mucous membranes are moist.   Neck: Supple. Full ROM.   Cardiovascular: Regular rate. Regular rhythm. No murmurs, rubs, or gallops. Distal pulses are 2+ and symmetric.  Pulmonary/Chest: No respiratory distress. Clear to auscultation bilaterally. No wheezing or rales.  Abdominal: Soft and non-distended.  There is no tenderness.  No rebound, guarding, or rigidity.   Genitourinary: No CVA tenderness  Musculoskeletal: Moves all extremities. No obvious deformities. No edema.   Skin: Warm and dry.  Neurological:  Alert, awake, and appropriate.  Normal speech.  No acute focal neurological deficits are appreciated.  Psychiatric: Tearful     ED Course   Procedures  ED Vital Signs:  Vitals:    10/31/23 1137   BP: 135/82   Pulse: 102   Resp: 20   Temp: 98.3 °F (36.8 °C)   TempSrc: Oral   SpO2: 100%   Weight: 71.4 kg (157 lb 6.5 oz)   Height: 4' 11" (1.499 m)       Abnormal Lab " Results:  Labs Reviewed   POCT GLUCOSE - Abnormal; Notable for the following components:       Result Value    POCT Glucose 147 (*)     All other components within normal limits        All Lab Results:  Results for orders placed or performed during the hospital encounter of 10/31/23   POCT glucose   Result Value Ref Range    POCT Glucose 147 (H) 70 - 110 mg/dL         Imaging Results:  Imaging Results    None               The Emergency Provider reviewed the vital signs and test results, which are outlined above.     ED Discussion     12:14 PM: Reassessed pt at this time.  Discussed with pt all pertinent ED information and results. Discussed pt dx and plan of tx. Gave pt all f/u and return to the ED instructions. All questions and concerns were addressed at this time. Pt expresses understanding of information and instructions, and is comfortable with plan to discharge. Pt is stable for discharge.    I discussed with patient and/or family/caretaker that evaluation in the ED does not suggest any emergent or life threatening medical conditions requiring immediate intervention beyond what was provided in the ED, and I believe patient is safe for discharge.  Regardless, an unremarkable evaluation in the ED does not preclude the development or presence of a serious of life threatening condition. As such, patient was instructed to return immediately for any worsening or change in current symptoms.         Medical Decision Making  53-year-old female who presents with anxiety.  She is already on Lexapro and Vistaril.  She reports that her anxiety was heightened by a recent stressor.  Her grandchild reportedly was given up for adoption.  She is hopeful for Valium.  She is however planning on driving later this afternoon, so no Valium given here in the emergency department.  Prescription of 5 low-dose 2 mg Valium were given for breakthrough severe anxiety until she could establish care with a primary care physician and/or  psychiatrist for further long-term management.  Patient was advised not to drive while taking Valium.  Patient voiced understanding.  She did mention elevated glucose levels.  She is 147 here.  She has known diabetes and takes Januvia.  Again needs to follow up with primary care physician.  No indication for pec at this time    Amount and/or Complexity of Data Reviewed  External Data Reviewed: notes.     Details: Past medical history reviewed, allergy list reviewed, medication list reviewed  Labs: ordered. Decision-making details documented in ED Course.    Risk  OTC drugs.  Prescription drug management.  Parenteral controlled substances.                ED Medication(s):  Medications - No data to display    New Prescriptions    No medications on file               Scribe Attestation:   Scribe #1: I performed the above scribed service and the documentation accurately describes the services I performed. I attest to the accuracy of the note.     Attending:   Physician Attestation Statement for Scribe #1: I, Jermaine Sotelo MD, personally performed the services described in this documentation, as scribed by Cheyenne Diane, in my presence, and it is both accurate and complete.           Clinical Impression       ICD-10-CM ICD-9-CM   1. Anxiety  F41.9 300.00   2. Type 2 diabetes mellitus without complication, unspecified whether long term insulin use  E11.9 250.00       Disposition:   Disposition: Discharged  Condition: Stable         Jermaine Sotelo MD  10/31/23 1234

## 2023-11-20 ENCOUNTER — HOSPITAL ENCOUNTER (EMERGENCY)
Facility: HOSPITAL | Age: 53
Discharge: HOME | End: 2023-11-20
Attending: EMERGENCY MEDICINE
Payer: MEDICAID

## 2023-11-20 VITALS
TEMPERATURE: 98 F | SYSTOLIC BLOOD PRESSURE: 187 MMHG | DIASTOLIC BLOOD PRESSURE: 89 MMHG | HEIGHT: 59 IN | WEIGHT: 161.19 LBS | OXYGEN SATURATION: 100 % | BODY MASS INDEX: 32.49 KG/M2 | HEART RATE: 70 BPM | RESPIRATION RATE: 16 BRPM

## 2023-11-20 DIAGNOSIS — R73.9 HYPERGLYCEMIA: Primary | ICD-10-CM

## 2023-11-20 DIAGNOSIS — R42 DIZZINESS: ICD-10-CM

## 2023-11-20 DIAGNOSIS — F41.1 ANXIETY REACTION: ICD-10-CM

## 2023-11-20 LAB
ALBUMIN SERPL BCP-MCNC: 4.6 G/DL (ref 3.5–5.2)
ALP SERPL-CCNC: 109 U/L (ref 55–135)
ALT SERPL W/O P-5'-P-CCNC: 63 U/L (ref 10–44)
ANION GAP SERPL CALC-SCNC: 14 MMOL/L (ref 8–16)
AST SERPL-CCNC: 39 U/L (ref 10–40)
B-OH-BUTYR BLD STRIP-SCNC: 0.1 MMOL/L (ref 0–0.5)
BACTERIA #/AREA URNS HPF: NORMAL /HPF
BASOPHILS # BLD AUTO: 0.05 K/UL (ref 0–0.2)
BASOPHILS NFR BLD: 0.5 % (ref 0–1.9)
BILIRUB SERPL-MCNC: 0.4 MG/DL (ref 0.1–1)
BILIRUB UR QL STRIP: NEGATIVE
BNP SERPL-MCNC: <10 PG/ML (ref 0–99)
BUN SERPL-MCNC: 15 MG/DL (ref 6–20)
CALCIUM SERPL-MCNC: 9.5 MG/DL (ref 8.7–10.5)
CHLORIDE SERPL-SCNC: 103 MMOL/L (ref 95–110)
CLARITY UR: CLEAR
CO2 SERPL-SCNC: 25 MMOL/L (ref 23–29)
COLOR UR: COLORLESS
CREAT SERPL-MCNC: 1 MG/DL (ref 0.5–1.4)
DIFFERENTIAL METHOD: ABNORMAL
EOSINOPHIL # BLD AUTO: 0.1 K/UL (ref 0–0.5)
EOSINOPHIL NFR BLD: 1.1 % (ref 0–8)
ERYTHROCYTE [DISTWIDTH] IN BLOOD BY AUTOMATED COUNT: 13.4 % (ref 11.5–14.5)
EST. GFR  (NO RACE VARIABLE): >60 ML/MIN/1.73 M^2
GLUCOSE SERPL-MCNC: 183 MG/DL (ref 70–110)
GLUCOSE UR QL STRIP: ABNORMAL
HCT VFR BLD AUTO: 40.4 % (ref 37–48.5)
HCV AB SERPL QL IA: NEGATIVE
HEP C VIRUS HOLD SPECIMEN: NORMAL
HGB BLD-MCNC: 13.9 G/DL (ref 12–16)
HGB UR QL STRIP: NEGATIVE
HIV 1+2 AB+HIV1 P24 AG SERPL QL IA: NEGATIVE
IMM GRANULOCYTES # BLD AUTO: 0.07 K/UL (ref 0–0.04)
IMM GRANULOCYTES NFR BLD AUTO: 0.7 % (ref 0–0.5)
KETONES UR QL STRIP: NEGATIVE
LEUKOCYTE ESTERASE UR QL STRIP: NEGATIVE
LYMPHOCYTES # BLD AUTO: 4.4 K/UL (ref 1–4.8)
LYMPHOCYTES NFR BLD: 46.5 % (ref 18–48)
MCH RBC QN AUTO: 29.3 PG (ref 27–31)
MCHC RBC AUTO-ENTMCNC: 34.4 G/DL (ref 32–36)
MCV RBC AUTO: 85 FL (ref 82–98)
MICROSCOPIC COMMENT: NORMAL
MONOCYTES # BLD AUTO: 1 K/UL (ref 0.3–1)
MONOCYTES NFR BLD: 10 % (ref 4–15)
NEUTROPHILS # BLD AUTO: 3.9 K/UL (ref 1.8–7.7)
NEUTROPHILS NFR BLD: 41.2 % (ref 38–73)
NITRITE UR QL STRIP: NEGATIVE
NRBC BLD-RTO: 0 /100 WBC
PH UR STRIP: 7 [PH] (ref 5–8)
PLATELET # BLD AUTO: 283 K/UL (ref 150–450)
PMV BLD AUTO: 8.6 FL (ref 9.2–12.9)
POCT GLUCOSE: 110 MG/DL (ref 70–110)
POCT GLUCOSE: 242 MG/DL (ref 70–110)
POTASSIUM SERPL-SCNC: 3.9 MMOL/L (ref 3.5–5.1)
PROT SERPL-MCNC: 8 G/DL (ref 6–8.4)
PROT UR QL STRIP: NEGATIVE
RBC # BLD AUTO: 4.75 M/UL (ref 4–5.4)
SODIUM SERPL-SCNC: 142 MMOL/L (ref 136–145)
SP GR UR STRIP: 1.02 (ref 1–1.03)
SQUAMOUS #/AREA URNS HPF: 2 /HPF
TROPONIN I SERPL DL<=0.01 NG/ML-MCNC: <0.006 NG/ML (ref 0–0.03)
URN SPEC COLLECT METH UR: ABNORMAL
UROBILINOGEN UR STRIP-ACNC: NEGATIVE EU/DL
WBC # BLD AUTO: 9.47 K/UL (ref 3.9–12.7)
WBC #/AREA URNS HPF: 3 /HPF (ref 0–5)
YEAST URNS QL MICRO: NORMAL

## 2023-11-20 PROCEDURE — 87389 HIV-1 AG W/HIV-1&-2 AB AG IA: CPT | Performed by: PHYSICIAN ASSISTANT

## 2023-11-20 PROCEDURE — 25000003 PHARM REV CODE 250: Performed by: EMERGENCY MEDICINE

## 2023-11-20 PROCEDURE — 93010 EKG 12-LEAD: ICD-10-PCS | Mod: ,,, | Performed by: INTERNAL MEDICINE

## 2023-11-20 PROCEDURE — 96361 HYDRATE IV INFUSION ADD-ON: CPT

## 2023-11-20 PROCEDURE — 93005 ELECTROCARDIOGRAM TRACING: CPT

## 2023-11-20 PROCEDURE — 36415 COLL VENOUS BLD VENIPUNCTURE: CPT | Performed by: EMERGENCY MEDICINE

## 2023-11-20 PROCEDURE — 99285 EMERGENCY DEPT VISIT HI MDM: CPT | Mod: 25

## 2023-11-20 PROCEDURE — 81000 URINALYSIS NONAUTO W/SCOPE: CPT | Performed by: NURSE PRACTITIONER

## 2023-11-20 PROCEDURE — 96360 HYDRATION IV INFUSION INIT: CPT

## 2023-11-20 PROCEDURE — 84484 ASSAY OF TROPONIN QUANT: CPT | Performed by: EMERGENCY MEDICINE

## 2023-11-20 PROCEDURE — 80053 COMPREHEN METABOLIC PANEL: CPT | Performed by: NURSE PRACTITIONER

## 2023-11-20 PROCEDURE — 82010 KETONE BODYS QUAN: CPT | Performed by: NURSE PRACTITIONER

## 2023-11-20 PROCEDURE — 85025 COMPLETE CBC W/AUTO DIFF WBC: CPT | Performed by: NURSE PRACTITIONER

## 2023-11-20 PROCEDURE — 86803 HEPATITIS C AB TEST: CPT | Performed by: PHYSICIAN ASSISTANT

## 2023-11-20 PROCEDURE — 83880 ASSAY OF NATRIURETIC PEPTIDE: CPT | Performed by: EMERGENCY MEDICINE

## 2023-11-20 PROCEDURE — 93010 ELECTROCARDIOGRAM REPORT: CPT | Mod: ,,, | Performed by: INTERNAL MEDICINE

## 2023-11-20 PROCEDURE — 25000003 PHARM REV CODE 250: Performed by: NURSE PRACTITIONER

## 2023-11-20 PROCEDURE — 82962 GLUCOSE BLOOD TEST: CPT

## 2023-11-20 RX ORDER — HYDROXYZINE PAMOATE 25 MG/1
25 CAPSULE ORAL EVERY 6 HOURS PRN
Qty: 30 CAPSULE | Refills: 0 | Status: SHIPPED | OUTPATIENT
Start: 2023-11-20

## 2023-11-20 RX ORDER — MECLIZINE HYDROCHLORIDE 25 MG/1
50 TABLET ORAL
Status: COMPLETED | OUTPATIENT
Start: 2023-11-20 | End: 2023-11-20

## 2023-11-20 RX ADMIN — SODIUM CHLORIDE 1000 ML: 9 INJECTION, SOLUTION INTRAVENOUS at 05:11

## 2023-11-20 RX ADMIN — MECLIZINE HYDROCHLORIDE 50 MG: 25 TABLET ORAL at 07:11

## 2023-11-20 RX ADMIN — SODIUM CHLORIDE 1000 ML: 9 INJECTION, SOLUTION INTRAVENOUS at 07:11

## 2023-11-20 NOTE — FIRST PROVIDER EVALUATION
"Medical screening examination initiated.  I have conducted a focused provider triage encounter, findings are as follows:    Brief history of present illness:  dizziness and elevated blood glucose     Vitals:    11/20/23 1424   BP: (!) 145/64   BP Location: Right arm   Patient Position: Sitting   Pulse: 94   Resp: 18   Temp: 98.5 °F (36.9 °C)   TempSrc: Oral   SpO2: 95%   Weight: 73.1 kg (161 lb 2.5 oz)   Height: 4' 11" (1.499 m)       Pertinent physical exam:  nad    Brief workup plan:  labs, further eval    Preliminary workup initiated; this workup will be continued and followed by the physician or advanced practice provider that is assigned to the patient when roomed.  "

## 2023-11-21 NOTE — DISCHARGE INSTRUCTIONS
Regarding HYPERGLYCEMIA, I advised patient that symptoms of an elevated blood glucose include fatigue, frequent urination, blurry vision, and feeling thirsty.  I advised patient that hyperglycemia can occur when one eats too much food, is ill, is under a lot of stress,  or does not take medications for diabetes as prescribed.  I reiterated that healthy eating is an important part of controlling blood glucose level.  I recommended that the patient: eat a variety of foods every day from all the food groups; eat meals at regular times every day and do not skip meals; choose high fiber carbohydrates (such as whole grains, legumes, and fruit) more often than less healthy carbohydrates (like white bread and sweets); and drink water and sugar-free beverages rather than sweetened beverages. I instructed patient to see care in the ED or with primary care provider if they experience: severe abdominal pain;  pain that spreads to the back; have increased vomiting; have trouble staying awake or focusing; are shaking or sweating; have blurred or double vision; have a fruity, sweet smell to breath; experience breathing  that is deep and labored, or rapid and shallow; or have a heartbeat that is fast and weak.    Regarding DIZZINESS, I advised patient to avoid sudden changes in posture, get up from a lying position slowly, and stay seated for a few moments before standing; and when standing, make sure you have something to hold on to.  Also encouraged patient to avoid activities such as driving, operating heavy machinery, and climbing until 1 week after symptoms disappear. Patient instructed to drink plenty of fluids and to eat several healthy, low fat meals throughout the day.    Regarding ANXIETY, I discussed signs and symptoms of anxiety with patient including: tachycardia, dysrhythmias, tachypnea, diaphoresis, trembling, dizziness, diarrhea, dry mouth, and difficulty swallowing.  Patient was encouraged to eat a well-balanced,  healthy diet; get plenty of rest; exercise daily; limit caffeine and alcohol intake; participate in meditation; talk with family and/or friends about things that may be considered stressful; and keep a diary of feelings and stress triggers.  Patient was instructed to take medications as prescribed and follow up with primary care provider for long term management. I recommended that the patient return to the emergency department if they: feel lightheaded or too dizzy to stand up; develop feelings that they want to hurt themselves or someone else; or develop chest pain, tightness, or heaviness that radiates to the shoulders, arms, jaw, neck, or back.     Driving or other activities under influence of medications - Patient and/or family/caretaker was given a prescription for, or instructed to use a medicine that may impair ability to drive, operate machinery, or participate in other potentially dangerous activities.  Patient was instructed not to participate in these activities while under the influence of these medications.

## 2023-11-21 NOTE — ED PROVIDER NOTES
SCRIBE #1 NOTE: I, Deepthiskip Huitron, am scribing for, and in the presence of, Josué Coates Jr., MD. I have scribed the entire note.       History     Chief Complaint   Patient presents with    Dizziness     Pt c/o dizziness that started a few min ago, also states her CBG is 245     Review of patient's allergies indicates:   Allergen Reactions    Asa [aspirin]     Augmentin [amoxicillin-pot clavulanate] Other (See Comments)     Pt states she can take amoxicillin alone, but cannot tolerate Augmentin, it eroded her esophagus.    Bactrim [sulfamethoxazole-trimethoprim] Hives and Swelling    Ciprofloxacin     Keflex [cephalexin]      dizziness    Sulfa (sulfonamide antibiotics) Other (See Comments)     Yeast infection         History of Present Illness     HPI    2023, 8:27 PM  History obtained from the patient      History of Present Illness: Ami Boogie is a 53 y.o. female patient with a PMHx of hyperlipidemia, COPD, anxiety who presents to the Emergency Department for evaluation of dizziness which onset today around noon. Pt states she got into a argument with her dementia patient and has since became anxious leading to lightheadedness/dizziness. Symptoms are constant and moderate in severity. No mitigating or exacerbating factors reported. Patient denies any dysuria, sob, weakness, cp, sob, and all other sxs at this time. No prior tx reported. No further complaints or concerns at this time.       Arrival mode: Personal vehicle        PCP: Franny Matthews MD        Past Medical History:  Past Medical History:   Diagnosis Date    Abnormal Pap smear of cervix     Anxiety     COPD (chronic obstructive pulmonary disease)     mild    Hyperlipidemia        Past Surgical History:  Past Surgical History:   Procedure Laterality Date     SECTION      HYSTERECTOMY      has partial cervix and ovaries left         Family History:  No family history on file.    Social History:  Social History     Tobacco  Use    Smoking status: Every Day     Current packs/day: 0.50     Types: Cigarettes    Smokeless tobacco: Never   Substance and Sexual Activity    Alcohol use: No    Drug use: No    Sexual activity: Not on file        Review of Systems     Review of Systems   Constitutional:  Negative for fever.   HENT:  Negative for sore throat.    Respiratory:  Negative for shortness of breath.    Cardiovascular:  Negative for chest pain.   Gastrointestinal:  Negative for nausea.   Genitourinary:  Negative for dysuria.   Musculoskeletal:  Negative for back pain.   Skin:  Negative for rash.   Neurological:  Positive for dizziness and light-headedness. Negative for weakness.   Hematological:  Does not bruise/bleed easily.   Psychiatric/Behavioral:  The patient is nervous/anxious.    All other systems reviewed and are negative.       Physical Exam     Initial Vitals [11/20/23 1424]   BP Pulse Resp Temp SpO2   (!) 145/64 94 18 98.5 °F (36.9 °C) 95 %      MAP       --          Physical Exam  Nursing Notes and Vital Signs Reviewed.  Constitutional: Patient is in no apparent distress. Well-developed and well-nourished.  Head: Atraumatic. Normocephalic.  Eyes: PERRL. EOM intact. Conjunctivae are not pale. No scleral icterus.  ENT: Mucous membranes are moist. Oropharynx is clear and symmetric.    Neck: Supple. Full ROM. No lymphadenopathy.  Cardiovascular: Regular rate. Regular rhythm. No murmurs, rubs, or gallops. Distal pulses are 2+ and symmetric.  Pulmonary/Chest: No respiratory distress. Clear to auscultation bilaterally. No wheezing or rales.  Abdominal: Soft and non-distended.  There is no tenderness.  No rebound, guarding, or rigidity. Good bowel sounds.  Genitourinary: No CVA tenderness  Musculoskeletal: Moves all extremities. No obvious deformities. No edema. No calf tenderness.  Skin: Warm and dry.  Neurological:  Alert, awake, and appropriate.  Normal speech.  No acute focal neurological deficits are appreciated.  Psychiatric:  "Normal affect. Good eye contact. Appropriate in content. GCS scale of 15     ED Course   Procedures  ED Vital Signs:  Vitals:    11/20/23 1424 11/20/23 1819 11/20/23 1820 11/20/23 1850   BP: (!) 145/64 (!) 159/80 (!) 159/80 (!) 175/89   Pulse: 94 65 82 66   Resp: 18 16 16 16   Temp: 98.5 °F (36.9 °C)  98.2 °F (36.8 °C)    TempSrc: Oral  Oral    SpO2: 95% 97% 96%    Weight: 73.1 kg (161 lb 2.5 oz)      Height: 4' 11" (1.499 m)       11/20/23 1855 11/20/23 1900 11/20/23 2135   BP: (!) 197/95 (!) 181/84 (!) 187/89   Pulse: 67 69 70   Resp: 16 16 16   Temp:   98.2 °F (36.8 °C)   TempSrc:   Oral   SpO2:   100%   Weight:      Height:          Abnormal Lab Results:  Labs Reviewed   CBC W/ AUTO DIFFERENTIAL - Abnormal; Notable for the following components:       Result Value    MPV 8.6 (*)     Immature Granulocytes 0.7 (*)     Immature Grans (Abs) 0.07 (*)     All other components within normal limits   COMPREHENSIVE METABOLIC PANEL - Abnormal; Notable for the following components:    Glucose 183 (*)     ALT 63 (*)     All other components within normal limits   URINALYSIS, REFLEX TO URINE CULTURE - Abnormal; Notable for the following components:    Color, UA Colorless (*)     Glucose, UA 4+ (*)     All other components within normal limits    Narrative:     Specimen Source->Urine   POCT GLUCOSE - Abnormal; Notable for the following components:    POCT Glucose 242 (*)     All other components within normal limits   BETA - HYDROXYBUTYRATE, SERUM   HIV 1 / 2 ANTIBODY    Narrative:     Release to patient->Immediate   HEPATITIS C ANTIBODY    Narrative:     Release to patient->Immediate   HEP C VIRUS HOLD SPECIMEN    Narrative:     Release to patient->Immediate   URINALYSIS MICROSCOPIC    Narrative:     Specimen Source->Urine   TROPONIN I   B-TYPE NATRIURETIC PEPTIDE   POCT GLUCOSE        All Lab Results:  Results for orders placed or performed during the hospital encounter of 11/20/23   CBC auto differential   Result Value Ref " Range    WBC 9.47 3.90 - 12.70 K/uL    RBC 4.75 4.00 - 5.40 M/uL    Hemoglobin 13.9 12.0 - 16.0 g/dL    Hematocrit 40.4 37.0 - 48.5 %    MCV 85 82 - 98 fL    MCH 29.3 27.0 - 31.0 pg    MCHC 34.4 32.0 - 36.0 g/dL    RDW 13.4 11.5 - 14.5 %    Platelets 283 150 - 450 K/uL    MPV 8.6 (L) 9.2 - 12.9 fL    Immature Granulocytes 0.7 (H) 0.0 - 0.5 %    Gran # (ANC) 3.9 1.8 - 7.7 K/uL    Immature Grans (Abs) 0.07 (H) 0.00 - 0.04 K/uL    Lymph # 4.4 1.0 - 4.8 K/uL    Mono # 1.0 0.3 - 1.0 K/uL    Eos # 0.1 0.0 - 0.5 K/uL    Baso # 0.05 0.00 - 0.20 K/uL    nRBC 0 0 /100 WBC    Gran % 41.2 38.0 - 73.0 %    Lymph % 46.5 18.0 - 48.0 %    Mono % 10.0 4.0 - 15.0 %    Eosinophil % 1.1 0.0 - 8.0 %    Basophil % 0.5 0.0 - 1.9 %    Differential Method Automated    Comprehensive metabolic panel   Result Value Ref Range    Sodium 142 136 - 145 mmol/L    Potassium 3.9 3.5 - 5.1 mmol/L    Chloride 103 95 - 110 mmol/L    CO2 25 23 - 29 mmol/L    Glucose 183 (H) 70 - 110 mg/dL    BUN 15 6 - 20 mg/dL    Creatinine 1.0 0.5 - 1.4 mg/dL    Calcium 9.5 8.7 - 10.5 mg/dL    Total Protein 8.0 6.0 - 8.4 g/dL    Albumin 4.6 3.5 - 5.2 g/dL    Total Bilirubin 0.4 0.1 - 1.0 mg/dL    Alkaline Phosphatase 109 55 - 135 U/L    AST 39 10 - 40 U/L    ALT 63 (H) 10 - 44 U/L    eGFR >60 >60 mL/min/1.73 m^2    Anion Gap 14 8 - 16 mmol/L   Beta - Hydroxybutyrate, Serum   Result Value Ref Range    Beta-Hydroxybutyrate 0.1 0.0 - 0.5 mmol/L   Urinalysis, Reflex to Urine Culture Urine, Clean Catch    Specimen: Urine   Result Value Ref Range    Specimen UA Urine, Clean Catch     Color, UA Colorless (A) Yellow, Straw, Ivy    Appearance, UA Clear Clear    pH, UA 7.0 5.0 - 8.0    Specific Gravity, UA 1.020 1.005 - 1.030    Protein, UA Negative Negative    Glucose, UA 4+ (A) Negative    Ketones, UA Negative Negative    Bilirubin (UA) Negative Negative    Occult Blood UA Negative Negative    Nitrite, UA Negative Negative    Urobilinogen, UA Negative <2.0 EU/dL     Leukocytes, UA Negative Negative   HIV 1/2 Ag/Ab (4th Gen)   Result Value Ref Range    HIV 1/2 Ag/Ab Negative Negative   Hepatitis C Antibody   Result Value Ref Range    Hepatitis C Ab Negative Negative   HCV Virus Hold Specimen   Result Value Ref Range    HEP C Virus Hold Specimen Hold for HCV sendout    Urinalysis Microscopic   Result Value Ref Range    WBC, UA 3 0 - 5 /hpf    Bacteria Rare None-Occ /hpf    Yeast, UA None None    Squam Epithel, UA 2 /hpf    Microscopic Comment SEE COMMENT    Troponin I #1   Result Value Ref Range    Troponin I <0.006 0.000 - 0.026 ng/mL   BNP   Result Value Ref Range    BNP <10 0 - 99 pg/mL   POCT glucose   Result Value Ref Range    POCT Glucose 242 (H) 70 - 110 mg/dL   POCT glucose   Result Value Ref Range    POCT Glucose 110 70 - 110 mg/dL         Imaging Results:  Imaging Results              X-Ray Chest AP Portable (Final result)  Result time 11/20/23 19:02:02      Final result by George Blanco MD (11/20/23 19:02:02)                   Impression:      No acute abnormality.    Cardiomegaly      Electronically signed by: George Blanco  Date:    11/20/2023  Time:    19:02               Narrative:    EXAMINATION:  XR CHEST AP PORTABLE    CLINICAL HISTORY:  dizziness;    TECHNIQUE:  Single frontal view of the chest was performed.    COMPARISON:  None    FINDINGS:  The lungs are clear, with normal appearance of pulmonary vasculature and no pleural effusion or pneumothorax.    Cardiomegaly the hilar and mediastinal contours are unremarkable.    Bones are intact.                                       The EKG was ordered, reviewed, and independently interpreted by the ED provider.  Interpretation time: 17:52  Rate: 67 BPM  Rhythm: normal sinus rhythm  Interpretation: Anterolateral infarct, age undetermined. No STEMI.           The Emergency Provider reviewed the vital signs and test results, which are outlined above.     ED Discussion     9:09 PM: Re-evaluated pt. Pt is resting  comfortably and is in no acute distress. BS has decreased to 110. Orthostatics all negative. I will prescribe hydroxyzine and refer her to pcp.  Pt states the dizziness has improved and she would like to eat. D/w pt all pertinent results. D/w pt any concerns expressed at this time. Answered all questions. Pt expresses understanding at this time.     Driving or other activities under influence of medications - Patient and/or family/caretaker was given a prescription for, or instructed to use a medicine that may impair ability to drive, operate machinery, or participate in other potentially dangerous activities.  Patient was instructed not to participate in these activities while under the influence of these medications.     9:12 PM: Reassessed pt at this time. Discussed with pt all pertinent ED information and results. Discussed pt dx and plan of tx. Gave pt all f/u and return to the ED instructions. All questions and concerns were addressed at this time. Pt expresses understanding of information and instructions, and is comfortable with plan to discharge. Pt is stable for discharge.    I discussed with patient and/or family/caretaker that evaluation in the ED does not suggest any emergent or life threatening medical conditions requiring immediate intervention beyond what was provided in the ED, and I believe patient is safe for discharge.  Regardless, an unremarkable evaluation in the ED does not preclude the development or presence of a serious of life threatening condition. As such, patient was instructed to return immediately for any worsening or change in current symptoms.     Regarding HYPERGLYCEMIA, I advised patient that symptoms of an elevated blood glucose include fatigue, frequent urination, blurry vision, and feeling thirsty.  I advised patient that hyperglycemia can occur when one eats too much food, is ill, is under a lot of stress,  or does not take medications for diabetes as prescribed.  I reiterated  that healthy eating is an important part of controlling blood glucose level.  I recommended that the patient: eat a variety of foods every day from all the food groups; eat meals at regular times every day and do not skip meals; choose high fiber carbohydrates (such as whole grains, legumes, and fruit) more often than less healthy carbohydrates (like white bread and sweets); and drink water and sugar-free beverages rather than sweetened beverages. I instructed patient to see care in the ED or with primary care provider if they experience: severe abdominal pain;  pain that spreads to the back; have increased vomiting; have trouble staying awake or focusing; are shaking or sweating; have blurred or double vision; have a fruity, sweet smell to breath; experience breathing  that is deep and labored, or rapid and shallow; or have a heartbeat that is fast and weak.    Regarding DIZZINESS, I advised patient to avoid sudden changes in posture, get up from a lying position slowly, and stay seated for a few moments before standing; and when standing, make sure you have something to hold on to.  Also encouraged patient to avoid activities such as driving, operating heavy machinery, and climbing until 1 week after symptoms disappear. Patient instructed to drink plenty of fluids and to eat several healthy, low fat meals throughout the day.    Regarding ANXIETY, I discussed signs and symptoms of anxiety with patient including: tachycardia, dysrhythmias, tachypnea, diaphoresis, trembling, dizziness, diarrhea, dry mouth, and difficulty swallowing.  Patient was encouraged to eat a well-balanced, healthy diet; get plenty of rest; exercise daily; limit caffeine and alcohol intake; participate in meditation; talk with family and/or friends about things that may be considered stressful; and keep a diary of feelings and stress triggers.  Patient was instructed to take medications as prescribed and follow up with primary care provider for  long term management. I recommended that the patient return to the emergency department if they: feel lightheaded or too dizzy to stand up; develop feelings that they want to hurt themselves or someone else; or develop chest pain, tightness, or heaviness that radiates to the shoulders, arms, jaw, neck, or back.     Driving or other activities under influence of medications - Patient and/or family/caretaker was given a prescription for, or instructed to use a medicine that may impair ability to drive, operate machinery, or participate in other potentially dangerous activities.  Patient was instructed not to participate in these activities while under the influence of these medications.       Medical Decision Making  Amount and/or Complexity of Data Reviewed  Labs: ordered. Decision-making details documented in ED Course.  Radiology: ordered and independent interpretation performed. Decision-making details documented in ED Course.  ECG/medicine tests: ordered and independent interpretation performed. Decision-making details documented in ED Course.    Risk  Prescription drug management.                ED Medication(s):  Medications   sodium chloride 0.9% bolus 1,000 mL 1,000 mL (0 mLs Intravenous Stopped 11/20/23 1810)   meclizine tablet 50 mg (50 mg Oral Given 11/20/23 1902)   sodium chloride 0.9% bolus 1,000 mL 1,000 mL (0 mLs Intravenous Stopped 11/20/23 2009)       Discharge Medication List as of 11/20/2023  9:11 PM        START taking these medications    Details   hydrOXYzine pamoate (VISTARIL) 25 MG Cap Take 1 capsule (25 mg total) by mouth every 6 (six) hours as needed (anxiety)., Starting Mon 11/20/2023, Print              Follow-up Information       Franny Matthews MD. Schedule an appointment as soon as possible for a visit in 1 week.    Specialty: Family Medicine  Contact information:  [address]  [address]  [facility] [address]  [phone]               [facility] - Emergency Dept..    Specialty:  Emergency Medicine  Why: If symptoms worsen, As needed  Contact information:  [address]  [facility] [address] [phone]                               Scribe Attestation:   Scribe #1: I performed the above scribed service and the documentation accurately describes the services I performed. I attest to the accuracy of the note.     Attending:   Physician Attestation Statement for Scribe #1: I, Josué Coates Jr., MD, personally performed the services described in this documentation, as scribed by Deepthi Huitron, in my presence, and it is both accurate and complete.           Clinical Impression       ICD-10-CM ICD-9-CM   1. Hyperglycemia  R73.9 790.29   2. Dizziness  R42 780.4   3. Anxiety reaction  F41.1 300.00       Disposition:   Disposition: Discharged  Condition: Stable        Josué Coates Jr., MD  11/23/23 1124

## 2023-11-21 NOTE — ED NOTES
Cardiac Monitor ordered. However, no cm room available at this time. Charge Nurse made aware of the need for CM room.

## 2024-05-01 RX ORDER — FLUTICASONE PROPIONATE 50 MCG
SPRAY, SUSPENSION (ML) NASAL
Qty: 16 G | Refills: 12 | Status: SHIPPED | OUTPATIENT
Start: 2024-05-01